# Patient Record
Sex: FEMALE | Race: WHITE | NOT HISPANIC OR LATINO | Employment: OTHER | URBAN - METROPOLITAN AREA
[De-identification: names, ages, dates, MRNs, and addresses within clinical notes are randomized per-mention and may not be internally consistent; named-entity substitution may affect disease eponyms.]

---

## 2024-04-15 ENCOUNTER — OFFICE VISIT (OUTPATIENT)
Dept: INTERNAL MEDICINE | Facility: CLINIC | Age: 79
End: 2024-04-15
Payer: MEDICARE

## 2024-04-15 VITALS
HEART RATE: 80 BPM | DIASTOLIC BLOOD PRESSURE: 82 MMHG | WEIGHT: 92 LBS | TEMPERATURE: 96.2 F | HEIGHT: 58 IN | OXYGEN SATURATION: 97 % | SYSTOLIC BLOOD PRESSURE: 118 MMHG | BODY MASS INDEX: 19.31 KG/M2

## 2024-04-15 DIAGNOSIS — M80.08XA AGE-RELATED OSTEOPOROSIS WITH CURRENT PATHOLOGICAL FRACTURE, VERTEBRA(E), INITIAL ENCOUNTER FOR FRACTURE: ICD-10-CM

## 2024-04-15 DIAGNOSIS — Z79.899 HIGH RISK MEDICATION USE: ICD-10-CM

## 2024-04-15 DIAGNOSIS — G47.00 INSOMNIA, UNSPECIFIED TYPE: ICD-10-CM

## 2024-04-15 DIAGNOSIS — G89.4 CHRONIC PAIN SYNDROME: Primary | ICD-10-CM

## 2024-04-15 DIAGNOSIS — G43.709 CHRONIC MIGRAINE WITHOUT AURA WITHOUT STATUS MIGRAINOSUS, NOT INTRACTABLE: ICD-10-CM

## 2024-04-15 PROCEDURE — 99205 OFFICE O/P NEW HI 60 MIN: CPT | Performed by: STUDENT IN AN ORGANIZED HEALTH CARE EDUCATION/TRAINING PROGRAM

## 2024-04-15 PROCEDURE — 1159F MED LIST DOCD IN RCRD: CPT | Performed by: STUDENT IN AN ORGANIZED HEALTH CARE EDUCATION/TRAINING PROGRAM

## 2024-04-15 PROCEDURE — 1160F RVW MEDS BY RX/DR IN RCRD: CPT | Performed by: STUDENT IN AN ORGANIZED HEALTH CARE EDUCATION/TRAINING PROGRAM

## 2024-04-15 RX ORDER — VENLAFAXINE HYDROCHLORIDE 37.5 MG/1
CAPSULE, EXTENDED RELEASE ORAL
Qty: 90 CAPSULE | Refills: 0 | Status: SHIPPED | OUTPATIENT
Start: 2024-04-15 | End: 2024-05-29

## 2024-04-15 RX ORDER — DESOXIMETASONE 2.5 MG/G
CREAM TOPICAL
COMMUNITY

## 2024-04-15 RX ORDER — SUMATRIPTAN 100 MG/1
TABLET, FILM COATED ORAL
COMMUNITY

## 2024-04-15 RX ORDER — TRAMADOL HYDROCHLORIDE 50 MG/1
1 TABLET ORAL EVERY 6 HOURS PRN
COMMUNITY
End: 2024-04-18 | Stop reason: SDUPTHER

## 2024-04-15 RX ORDER — ALENDRONATE SODIUM 70 MG/1
TABLET ORAL
COMMUNITY
End: 2024-04-18 | Stop reason: SDUPTHER

## 2024-04-15 RX ORDER — ACYCLOVIR 50 MG/G
OINTMENT TOPICAL
COMMUNITY
Start: 2023-10-31

## 2024-04-15 RX ORDER — ZOLPIDEM TARTRATE 10 MG/1
.5-1 TABLET ORAL
COMMUNITY

## 2024-04-15 RX ORDER — MELATONIN
2000 DAILY
COMMUNITY

## 2024-04-15 NOTE — PROGRESS NOTES
New Patient Office Visit      Patient Name: Rafaela Cunha  : 1945   MRN: [id number]   Care Team: Patient Care Team:  Lexa Moreno MD as PCP - General (Internal Medicine)    Chief Complaint:    Chief Complaint   Patient presents with    Establish Care    Insomnia    Osteoporosis    Pain       History of Present Illness: Rafaela Cunha is a 78 y.o. female who is here today to establish care as a new patient     She was previously living in [place] however moved here recently and [place] [facility]    She has no specific acute complaints or concerns today besides  management of her chronic pain 2/2 to multiple fragility fractures from severe osteoporosis. She states she has tried multiple other medications to treat her osteoporosis, including Forteo, Tymlos, and Evenity and she did not tolerate any of them due to significant bone pain after infusion/injection. She is currently on fosamax and reprots last DEXA was 2022. She currently takes tramadol for pain and takes it 1-2x/d depending on how bad her pain is, reports being on multiple other medications including morphine, oxycodone. She also has significant insomnia in which she takes ambien prn and this is also from her pain    Additionally she has frequent migraines, exacerbated by her pain and recent move from [place]. She has been taking sumatriptan multiple times a week but is not on preventive medication.    Subjective      Review of Systems:   Review of Systems   Constitutional:  Negative for unexpected weight loss.   Musculoskeletal:  Positive for arthralgias, back pain and gait problem.   Neurological:  Positive for headache.   Psychiatric/Behavioral:  Positive for sleep disturbance.     - See HPI    Past Medical History: History reviewed. No pertinent past medical history.    Past Surgical History: History reviewed. No pertinent surgical history.    Family History: History reviewed. No pertinent family history.    Social History:   Social History      Socioeconomic History    Marital status:    Tobacco Use    Smoking status: Never     Passive exposure: Never    Smokeless tobacco: Never   Vaping Use    Vaping status: Never Used   Substance and Sexual Activity    Alcohol use: Yes     Comment: RARE    Drug use: Never    Sexual activity: Defer       Tobacco History:   Social History     Tobacco Use   Smoking Status Never    Passive exposure: Never   Smokeless Tobacco Never       Medications:     Current Outpatient Medications:     acyclovir (ZOVIRAX) 5 % ointment, APPLY TO THE AFFECTED AREA(S) BY TOPICAL ROUTE EVERY 3 HOURS 6 TIMES PER DAY, Disp: , Rfl:     alendronate (FOSAMAX) 70 MG tablet, Take ONE tablet every week by oral route., Disp: , Rfl:     Calcium Citrate 150 MG capsule, Take 250 mg by mouth., Disp: , Rfl:     Cholecalciferol 25 MCG (1000 UT) tablet, Take 2 tablets by mouth Daily., Disp: , Rfl:     desoximetasone (TOPICORT) 0.25 % cream, APPLY A THIN LAYER TO THE AFFECTED AREA(S) BY TOPICAL ROUTE 2 TIMES PER DAY ; RUB IN GENTLY AND COMPLETELY, Disp: , Rfl:     MAGNESIUM GLYCINATE PO, Take  by mouth., Disp: , Rfl:     mupirocin (BACTROBAN) 2 % ointment, , Disp: , Rfl:     NON FORMULARY, Take 335 mg by mouth., Disp: , Rfl:     SUMAtriptan (IMITREX) 100 MG tablet, TAKE 1/2 TABLET BY MOUTH AT ONSET OF HEADACHE, Disp: , Rfl:     traMADol (ULTRAM) 50 MG tablet, Take 1 tablet by mouth Every 6 (Six) Hours As Needed., Disp: , Rfl:     zolpidem (AMBIEN) 10 MG tablet, Take 0.5-1 tablets by mouth every night at bedtime., Disp: , Rfl:     venlafaxine XR (Effexor XR) 37.5 MG 24 hr capsule, Take 1 capsule by mouth Daily for 14 days, THEN 2 capsules Daily for 30 days., Disp: 90 capsule, Rfl: 0    Allergies:   Allergies   Allergen Reactions    Ketorolac Tromethamine Other (See Comments)    Romosozumab Other (See Comments)    Abaloparatide Other (See Comments)    Codeine Other (See Comments)    Parathyroid Hormone (Recomb) Other (See Comments)    Raloxifene  "Other (See Comments)    Teriparatide Unknown - High Severity       Objective     Physical Exam:  Vital Signs:   Vitals:    04/15/24 1434   BP: 118/82   Pulse: 80   Temp: 96.2 °F (35.7 °C)   TempSrc: Temporal   SpO2: 97%   Weight: 41.7 kg (92 lb)   Height: 147.3 cm (58\")     Body mass index is 19.23 kg/m².     Physical Exam  Vitals reviewed.   Constitutional:       General: She is not in acute distress.     Appearance: Normal appearance. She is underweight. She is not ill-appearing or toxic-appearing.   HENT:      Head: Normocephalic and atraumatic.   Eyes:      General: No scleral icterus.     Conjunctiva/sclera: Conjunctivae normal.   Skin:     General: Skin is warm and dry.   Neurological:      Mental Status: She is alert and oriented to person, place, and time.   Psychiatric:         Mood and Affect: Mood normal.         Behavior: Behavior normal.         Assessment / Plan      Assessment/Plan:   Problems Addressed This Visit  Diagnoses and all orders for this visit:    1. Chronic pain syndrome (Primary)    2. Insomnia, unspecified type    3. Chronic migraine without aura without status migrainosus, not intractable  -     venlafaxine XR (Effexor XR) 37.5 MG 24 hr capsule; Take 1 capsule by mouth Daily for 14 days, THEN 2 capsules Daily for 30 days.  Dispense: 90 capsule; Refill: 0    4. High risk medication use  -     Compliance Drug Analysis, Ur - Urine, Clean Catch    5. Age-related osteoporosis with current pathological fracture, vertebra(e), initial encounter for fracture  -     DEXA Bone Density Axial; Future      Ms. Cunha is 77yo F who presents to clinic today as new patient to establish care. I have reviewed her most recent labs dating back to 2018 as well as available PCP notes in Care Everywhere    Chronic pain is somewhat managed with her tramadol, unfortunately this is all secondary to her reported severe osteoporosis with multiple fragility fractures. Has not tolerated multiple therapies, having " significant and debiliatating bone pain from Forteo, Tymlos, and Evenity. Will review [facility] and obtain UDS today with contract filled, continue tramadol for now.     Osteoporosis is likely going to be difficult to manage I suspect. Unable to see DEXA scan results but she reports significantly severe disease and unable to tolerate multiple options. Will try to receive outside records and have her scheduled for DEXA to further evaluate. Consider referral to specialist if necessary.     Migraines are chronic, worsening. Recently exacerbated by move across country, will start effexor for preventive. .     Will have her RTC In about 2mo, will have her get labs before       Plan of care reviewed with patient at the conclusion of today's visit. Education was provided regarding diagnosis and management.  Patient verbalizes understanding of and agreement with management plan.      Follow Up:   Return in about 2 months (around 6/15/2024) for Medicare Wellness.    I spent 62 minutes caring for Rafaela on this date of service. This time includes time spent by me in the following activities:preparing for the visit, reviewing tests, obtaining and/or reviewing a separately obtained history, performing a medically appropriate examination and/or evaluation , counseling and educating the patient/family/caregiver, ordering medications, tests, or procedures, documenting information in the medical record, and independently interpreting results and communicating that information with the patient/family/caregiver      MD [facility]EMIM[facility]  [address] [place] [phone]  Fax [phone]

## 2024-04-18 DIAGNOSIS — M80.08XA AGE-RELATED OSTEOPOROSIS WITH CURRENT PATHOLOGICAL FRACTURE, VERTEBRA(E), INITIAL ENCOUNTER FOR FRACTURE: ICD-10-CM

## 2024-04-18 DIAGNOSIS — G89.4 CHRONIC PAIN SYNDROME: Primary | ICD-10-CM

## 2024-04-18 RX ORDER — TRAMADOL HYDROCHLORIDE 50 MG/1
50 TABLET ORAL EVERY 6 HOURS PRN
Qty: 30 TABLET | Refills: 0 | Status: SHIPPED | OUTPATIENT
Start: 2024-04-18

## 2024-04-18 RX ORDER — ALENDRONATE SODIUM 70 MG/1
70 TABLET ORAL
Qty: 15 TABLET | Refills: 0 | Status: SHIPPED | OUTPATIENT
Start: 2024-04-18

## 2024-04-18 NOTE — TELEPHONE ENCOUNTER
Caller: Rafaela Cunha    Relationship: Self    Best call back number: [phone]     Requested Prescriptions:   Requested Prescriptions     Pending Prescriptions Disp Refills    traMADol (ULTRAM) 50 MG tablet       Sig: Take 1 tablet by mouth Every 6 (Six) Hours As Needed.    alendronate (FOSAMAX) 70 MG tablet          Pharmacy where request should be sent: [facility] #[id number] [address] AT [facility] MICHAEL  [place]  [phone] [facility] [phone]      Last office visit with prescribing clinician: 4/15/2024   Last telemedicine visit with prescribing clinician: Visit date not found   Next office visit with prescribing clinician: 6/14/2024     Additional details provided by patient: PATIENT STATES PROVIDER AGREED TO TAKE OVER BOTH MEDICATIONS FOR HER.     Does the patient have less than a 3 day supply:  [] Yes  [x] No    Would you like a call back once the refill request has been completed: [] Yes [x] No    If the office needs to give you a call back, can they leave a voicemail: [] Yes [x] No    Otoniel Gan Rep   04/18/24 09:57 EDT

## 2024-04-24 LAB — DRUGS UR: NORMAL

## 2024-05-06 ENCOUNTER — LAB (OUTPATIENT)
Facility: HOSPITAL | Age: 79
End: 2024-05-06
Payer: MEDICARE

## 2024-05-06 ENCOUNTER — HOSPITAL ENCOUNTER (OUTPATIENT)
Facility: HOSPITAL | Age: 79
Discharge: HOME | End: 2024-05-06
Payer: MEDICARE

## 2024-05-06 DIAGNOSIS — M80.08XA AGE-RELATED OSTEOPOROSIS WITH CURRENT PATHOLOGICAL FRACTURE, VERTEBRA(E), INITIAL ENCOUNTER FOR FRACTURE: ICD-10-CM

## 2024-05-06 LAB
25(OH)D3 SERPL-MCNC: 42.3 NG/ML (ref 30–100)
ALBUMIN SERPL-MCNC: 4.5 G/DL (ref 3.5–5.2)
ALBUMIN/GLOB SERPL: 2 G/DL
ALP SERPL-CCNC: 52 U/L (ref 39–117)
ALT SERPL W P-5'-P-CCNC: 15 U/L (ref 1–33)
ANION GAP SERPL CALCULATED.3IONS-SCNC: 9 MMOL/L (ref 5–15)
AST SERPL-CCNC: 17 U/L (ref 1–32)
BILIRUB SERPL-MCNC: 0.2 MG/DL (ref 0–1.2)
BUN SERPL-MCNC: 15 MG/DL (ref 8–23)
BUN/CREAT SERPL: 23.1 (ref 7–25)
CALCIUM SPEC-SCNC: 9.1 MG/DL (ref 8.6–10.5)
CHLORIDE SERPL-SCNC: 104 MMOL/L (ref 98–107)
CO2 SERPL-SCNC: 28 MMOL/L (ref 22–29)
CREAT SERPL-MCNC: 0.65 MG/DL (ref 0.57–1)
EGFRCR SERPLBLD CKD-EPI 2021: 91.4 ML/MIN/1.73
GLOBULIN UR ELPH-MCNC: 2.2 GM/DL
GLUCOSE SERPL-MCNC: 91 MG/DL (ref 65–99)
PHOSPHATE SERPL-MCNC: 3.7 MG/DL (ref 2.5–4.5)
POTASSIUM SERPL-SCNC: 4.3 MMOL/L (ref 3.5–5.2)
PROT SERPL-MCNC: 6.7 G/DL (ref 6–8.5)
SODIUM SERPL-SCNC: 141 MMOL/L (ref 136–145)

## 2024-05-06 PROCEDURE — 36415 COLL VENOUS BLD VENIPUNCTURE: CPT

## 2024-05-06 PROCEDURE — 77080 DXA BONE DENSITY AXIAL: CPT

## 2024-05-06 PROCEDURE — 82306 VITAMIN D 25 HYDROXY: CPT | Performed by: STUDENT IN AN ORGANIZED HEALTH CARE EDUCATION/TRAINING PROGRAM

## 2024-05-06 PROCEDURE — 84100 ASSAY OF PHOSPHORUS: CPT | Performed by: STUDENT IN AN ORGANIZED HEALTH CARE EDUCATION/TRAINING PROGRAM

## 2024-05-06 PROCEDURE — 80053 COMPREHEN METABOLIC PANEL: CPT | Performed by: STUDENT IN AN ORGANIZED HEALTH CARE EDUCATION/TRAINING PROGRAM

## 2024-05-08 DIAGNOSIS — M80.08XA AGE-RELATED OSTEOPOROSIS WITH CURRENT PATHOLOGICAL FRACTURE, VERTEBRA(E), INITIAL ENCOUNTER FOR FRACTURE: Primary | ICD-10-CM

## 2024-05-08 RX ORDER — SUMATRIPTAN 100 MG/1
TABLET, FILM COATED ORAL
Qty: 90 TABLET | Refills: 0 | Status: SHIPPED | OUTPATIENT
Start: 2024-05-08

## 2024-05-20 RX ORDER — ALENDRONATE SODIUM 70 MG/1
70 TABLET ORAL
Qty: 15 TABLET | Refills: 0 | Status: SHIPPED | OUTPATIENT
Start: 2024-05-20

## 2024-05-20 NOTE — TELEPHONE ENCOUNTER
Caller: Jeannette Cunhaan    Relationship: Self    Best call back number: [phone]    Requested Prescriptions:   Requested Prescriptions     Pending Prescriptions Disp Refills    alendronate (FOSAMAX) 70 MG tablet 15 tablet 0     Sig: Take 1 tablet by mouth Every 7 (Seven) Days.        Pharmacy where request should be sent:  [facility]     Last office visit with prescribing clinician: 4/15/2024   Last telemedicine visit with prescribing clinician: Visit date not found   Next office visit with prescribing clinician: 6/14/2024     Additional details provided by patient: N/A     Does the patient have less than a 3 day supply:  [x] Yes  [] No    Would you like a call back once the refill request has been completed: [x] Yes [] No    If the office needs to give you a call back, can they leave a voicemail: [x] Yes [] No    Otoniel Olvera Rep   05/20/24 14:33 EDT

## 2024-06-14 ENCOUNTER — OFFICE VISIT (OUTPATIENT)
Dept: INTERNAL MEDICINE | Facility: CLINIC | Age: 79
End: 2024-06-14
Payer: MEDICARE

## 2024-06-14 ENCOUNTER — LAB (OUTPATIENT)
Facility: HOSPITAL | Age: 79
End: 2024-06-14
Payer: MEDICARE

## 2024-06-14 VITALS
HEIGHT: 58 IN | SYSTOLIC BLOOD PRESSURE: 122 MMHG | DIASTOLIC BLOOD PRESSURE: 60 MMHG | HEART RATE: 91 BPM | BODY MASS INDEX: 19.73 KG/M2 | WEIGHT: 94 LBS | TEMPERATURE: 98.5 F

## 2024-06-14 DIAGNOSIS — G47.00 INSOMNIA, UNSPECIFIED TYPE: ICD-10-CM

## 2024-06-14 DIAGNOSIS — M80.08XA AGE-RELATED OSTEOPOROSIS WITH CURRENT PATHOLOGICAL FRACTURE, VERTEBRA(E), INITIAL ENCOUNTER FOR FRACTURE: ICD-10-CM

## 2024-06-14 DIAGNOSIS — R61 NIGHT SWEATS: ICD-10-CM

## 2024-06-14 DIAGNOSIS — Z00.00 MEDICARE ANNUAL WELLNESS VISIT, SUBSEQUENT: Primary | ICD-10-CM

## 2024-06-14 DIAGNOSIS — G89.4 CHRONIC PAIN SYNDROME: ICD-10-CM

## 2024-06-14 DIAGNOSIS — G43.709 CHRONIC MIGRAINE WITHOUT AURA WITHOUT STATUS MIGRAINOSUS, NOT INTRACTABLE: ICD-10-CM

## 2024-06-14 DIAGNOSIS — Z11.59 ENCOUNTER FOR HEPATITIS C SCREENING TEST FOR LOW RISK PATIENT: ICD-10-CM

## 2024-06-14 LAB
BASOPHILS # BLD AUTO: 0.04 10*3/MM3 (ref 0–0.2)
BASOPHILS NFR BLD AUTO: 0.7 % (ref 0–1.5)
DEPRECATED RDW RBC AUTO: 41 FL (ref 37–54)
EOSINOPHIL # BLD AUTO: 0.15 10*3/MM3 (ref 0–0.4)
EOSINOPHIL NFR BLD AUTO: 2.5 % (ref 0.3–6.2)
ERYTHROCYTE [DISTWIDTH] IN BLOOD BY AUTOMATED COUNT: 12.1 % (ref 12.3–15.4)
HCT VFR BLD AUTO: 43.7 % (ref 34–46.6)
HCV AB SER QL: NORMAL
HGB BLD-MCNC: 14.8 G/DL (ref 12–15.9)
IMM GRANULOCYTES # BLD AUTO: 0.01 10*3/MM3 (ref 0–0.05)
IMM GRANULOCYTES NFR BLD AUTO: 0.2 % (ref 0–0.5)
LYMPHOCYTES # BLD AUTO: 1.36 10*3/MM3 (ref 0.7–3.1)
LYMPHOCYTES NFR BLD AUTO: 22.4 % (ref 19.6–45.3)
MCH RBC QN AUTO: 31.6 PG (ref 26.6–33)
MCHC RBC AUTO-ENTMCNC: 33.9 G/DL (ref 31.5–35.7)
MCV RBC AUTO: 93.4 FL (ref 79–97)
MONOCYTES # BLD AUTO: 0.64 10*3/MM3 (ref 0.1–0.9)
MONOCYTES NFR BLD AUTO: 10.5 % (ref 5–12)
NEUTROPHILS NFR BLD AUTO: 3.87 10*3/MM3 (ref 1.7–7)
NEUTROPHILS NFR BLD AUTO: 63.7 % (ref 42.7–76)
NRBC BLD AUTO-RTO: 0 /100 WBC (ref 0–0.2)
PLATELET # BLD AUTO: 203 10*3/MM3 (ref 140–450)
PMV BLD AUTO: 10.7 FL (ref 6–12)
RBC # BLD AUTO: 4.68 10*6/MM3 (ref 3.77–5.28)
T4 FREE SERPL-MCNC: 1.07 NG/DL (ref 0.92–1.68)
TSH SERPL DL<=0.05 MIU/L-ACNC: 0.62 UIU/ML (ref 0.27–4.2)
WBC NRBC COR # BLD AUTO: 6.07 10*3/MM3 (ref 3.4–10.8)

## 2024-06-14 PROCEDURE — 84443 ASSAY THYROID STIM HORMONE: CPT | Performed by: STUDENT IN AN ORGANIZED HEALTH CARE EDUCATION/TRAINING PROGRAM

## 2024-06-14 PROCEDURE — 86803 HEPATITIS C AB TEST: CPT | Performed by: STUDENT IN AN ORGANIZED HEALTH CARE EDUCATION/TRAINING PROGRAM

## 2024-06-14 PROCEDURE — 99214 OFFICE O/P EST MOD 30 MIN: CPT | Performed by: STUDENT IN AN ORGANIZED HEALTH CARE EDUCATION/TRAINING PROGRAM

## 2024-06-14 PROCEDURE — 85025 COMPLETE CBC W/AUTO DIFF WBC: CPT | Performed by: STUDENT IN AN ORGANIZED HEALTH CARE EDUCATION/TRAINING PROGRAM

## 2024-06-14 PROCEDURE — 84439 ASSAY OF FREE THYROXINE: CPT | Performed by: STUDENT IN AN ORGANIZED HEALTH CARE EDUCATION/TRAINING PROGRAM

## 2024-06-14 PROCEDURE — 36415 COLL VENOUS BLD VENIPUNCTURE: CPT | Performed by: STUDENT IN AN ORGANIZED HEALTH CARE EDUCATION/TRAINING PROGRAM

## 2024-06-14 PROCEDURE — G0439 PPPS, SUBSEQ VISIT: HCPCS | Performed by: STUDENT IN AN ORGANIZED HEALTH CARE EDUCATION/TRAINING PROGRAM

## 2024-06-14 PROCEDURE — 1170F FXNL STATUS ASSESSED: CPT | Performed by: STUDENT IN AN ORGANIZED HEALTH CARE EDUCATION/TRAINING PROGRAM

## 2024-06-14 RX ORDER — TRAMADOL HYDROCHLORIDE 50 MG/1
100 TABLET ORAL EVERY 12 HOURS PRN
Qty: 180 TABLET | Refills: 0 | Status: SHIPPED | OUTPATIENT
Start: 2024-06-14

## 2024-06-14 RX ORDER — ZOLPIDEM TARTRATE 10 MG/1
5 TABLET ORAL NIGHTLY PRN
Qty: 90 TABLET | Refills: 0 | Status: SHIPPED | OUTPATIENT
Start: 2024-06-14

## 2024-06-14 RX ORDER — AMITRIPTYLINE HYDROCHLORIDE 10 MG/1
10 TABLET, FILM COATED ORAL NIGHTLY
Qty: 60 TABLET | Refills: 0 | Status: SHIPPED | OUTPATIENT
Start: 2024-06-14

## 2024-06-17 NOTE — PROGRESS NOTES
Her labs all look great with no signs of infection, blood count abnormalities or thyroid dysfunction.

## 2024-08-23 DIAGNOSIS — G89.4 CHRONIC PAIN SYNDROME: ICD-10-CM

## 2024-08-23 DIAGNOSIS — M80.08XA AGE-RELATED OSTEOPOROSIS WITH CURRENT PATHOLOGICAL FRACTURE, VERTEBRA(E), INITIAL ENCOUNTER FOR FRACTURE: ICD-10-CM

## 2024-08-23 RX ORDER — TRAMADOL HYDROCHLORIDE 50 MG/1
100 TABLET ORAL EVERY 12 HOURS PRN
Qty: 180 TABLET | Refills: 0 | Status: SHIPPED | OUTPATIENT
Start: 2024-08-23

## 2024-08-23 NOTE — TELEPHONE ENCOUNTER
Caller: Rafaela Cunha    Relationship: Self    Best call back number: [phone]     Requested Prescriptions:   Requested Prescriptions     Pending Prescriptions Disp Refills    traMADol (ULTRAM) 50 MG tablet 180 tablet 0     Sig: Take 2 tablets by mouth Every 12 (Twelve) Hours As Needed for Severe Pain.        Pharmacy where request should be sent: [facility] #[id number] [place] [id number] [place] [phone] [place] [phone]      Last office visit with prescribing clinician: 6/14/2024   Last telemedicine visit with prescribing clinician: Visit date not found   Next office visit with prescribing clinician: 9/17/2024     Additional details provided by patient: PATIENT HAS A 2 DAY SUPPLY. PATIENT IS NEEDING THIS PRESCRIPTION SWITCHED TO THE [facility] LISTED ABOVE.    Does the patient have less than a 3 day supply:  [x] Yes  [] No    Would you like a call back once the refill request has been completed: [] Yes [x] No    If the office needs to give you a call back, can they leave a voicemail: [] Yes [x] No    Otoniel Wallace Rep   08/23/24 09:19 EDT

## 2024-08-30 RX ORDER — SUMATRIPTAN 100 MG/1
TABLET, FILM COATED ORAL
Qty: 90 TABLET | Refills: 0 | Status: SHIPPED | OUTPATIENT
Start: 2024-08-30

## 2024-08-30 NOTE — TELEPHONE ENCOUNTER
Caller: Rafaela Cunha    Relationship: Self    Best call back number: [phone]     Requested Prescriptions:   Requested Prescriptions     Pending Prescriptions Disp Refills    SUMAtriptan (IMITREX) 100 MG tablet 90 tablet 0     Sig: Take one tablet at onset of headache. May repeat dose one time in 2 hours if headache not relieved.        Pharm[facility] where request [facility] be sent[id number] [address] [place] [phone] [place] [phone] [facility]     Last office visit with prescribing clinician: 6/14/2024   Last telemedicine visit with prescribing clinician: Visit date not found   Next office visit with prescribing clinician: 9/17/2024     Additional details provided by patient: 3 PILLS LEFT    Does the patient have less than a 3 day supply:  [] Yes  [x] No    Would you like a call back once the refill request has been completed: [] Yes [x] No    If the office needs to give you a call back, can they leave a voicemail: [] Yes [x] No    Otoniel Gan Rep   08/30/24 09:10 EDT

## 2024-09-17 ENCOUNTER — OFFICE VISIT (OUTPATIENT)
Dept: INTERNAL MEDICINE | Facility: CLINIC | Age: 79
End: 2024-09-17
Payer: MEDICARE

## 2024-09-17 VITALS
DIASTOLIC BLOOD PRESSURE: 78 MMHG | TEMPERATURE: 95.2 F | WEIGHT: 93.4 LBS | OXYGEN SATURATION: 98 % | HEART RATE: 73 BPM | HEIGHT: 58 IN | SYSTOLIC BLOOD PRESSURE: 106 MMHG | BODY MASS INDEX: 19.61 KG/M2

## 2024-09-17 DIAGNOSIS — G89.29 CHRONIC MIDLINE LOW BACK PAIN WITHOUT SCIATICA: ICD-10-CM

## 2024-09-17 DIAGNOSIS — Z23 NEED FOR INFLUENZA VACCINATION: ICD-10-CM

## 2024-09-17 DIAGNOSIS — M54.50 CHRONIC MIDLINE LOW BACK PAIN WITHOUT SCIATICA: ICD-10-CM

## 2024-09-17 DIAGNOSIS — M80.08XA AGE-RELATED OSTEOPOROSIS WITH CURRENT PATHOLOGICAL FRACTURE, VERTEBRA(E), INITIAL ENCOUNTER FOR FRACTURE: Primary | ICD-10-CM

## 2024-09-17 DIAGNOSIS — G89.4 CHRONIC PAIN SYNDROME: ICD-10-CM

## 2024-09-17 PROBLEM — M81.0 OSTEOPOROSIS: Status: ACTIVE | Noted: 2017-10-29

## 2024-09-17 PROBLEM — G47.00 INSOMNIA: Status: ACTIVE | Noted: 2017-10-29

## 2024-09-17 PROBLEM — S22.009A CLOSED FRACTURE OF THORACIC VERTEBRA: Chronic | Status: ACTIVE | Noted: 2020-10-14

## 2024-09-17 PROBLEM — S32.9XXD COMPRESSION FRACTURE OF PELVIS WITH ROUTINE HEALING: Status: ACTIVE | Noted: 2021-12-16

## 2024-09-17 PROCEDURE — 90662 IIV NO PRSV INCREASED AG IM: CPT | Performed by: STUDENT IN AN ORGANIZED HEALTH CARE EDUCATION/TRAINING PROGRAM

## 2024-09-17 PROCEDURE — 1159F MED LIST DOCD IN RCRD: CPT | Performed by: STUDENT IN AN ORGANIZED HEALTH CARE EDUCATION/TRAINING PROGRAM

## 2024-09-17 PROCEDURE — G0008 ADMIN INFLUENZA VIRUS VAC: HCPCS | Performed by: STUDENT IN AN ORGANIZED HEALTH CARE EDUCATION/TRAINING PROGRAM

## 2024-09-17 PROCEDURE — 1125F AMNT PAIN NOTED PAIN PRSNT: CPT | Performed by: STUDENT IN AN ORGANIZED HEALTH CARE EDUCATION/TRAINING PROGRAM

## 2024-09-17 PROCEDURE — 1160F RVW MEDS BY RX/DR IN RCRD: CPT | Performed by: STUDENT IN AN ORGANIZED HEALTH CARE EDUCATION/TRAINING PROGRAM

## 2024-09-17 PROCEDURE — 99214 OFFICE O/P EST MOD 30 MIN: CPT | Performed by: STUDENT IN AN ORGANIZED HEALTH CARE EDUCATION/TRAINING PROGRAM

## 2024-09-17 RX ORDER — DULOXETIN HYDROCHLORIDE 30 MG/1
30 CAPSULE, DELAYED RELEASE ORAL DAILY
Qty: 90 CAPSULE | Refills: 1 | Status: SHIPPED | OUTPATIENT
Start: 2024-09-17

## 2024-09-17 RX ORDER — DULOXETIN HYDROCHLORIDE 30 MG/1
30 CAPSULE, DELAYED RELEASE ORAL DAILY
Qty: 90 CAPSULE | Refills: 1 | Status: SHIPPED | OUTPATIENT
Start: 2024-09-17 | End: 2024-09-17

## 2024-09-18 RX ORDER — ALENDRONATE SODIUM 70 MG/1
70 TABLET ORAL
Qty: 16 TABLET | Refills: 0 | Status: SHIPPED | OUTPATIENT
Start: 2024-09-18

## 2024-09-27 ENCOUNTER — HOSPITAL ENCOUNTER (OUTPATIENT)
Facility: HOSPITAL | Age: 79
Discharge: HOME | End: 2024-09-27
Payer: MEDICARE

## 2024-09-27 ENCOUNTER — OFFICE VISIT (OUTPATIENT)
Dept: INTERNAL MEDICINE | Facility: CLINIC | Age: 79
End: 2024-09-27
Payer: MEDICARE

## 2024-09-27 VITALS
SYSTOLIC BLOOD PRESSURE: 128 MMHG | TEMPERATURE: 98.8 F | BODY MASS INDEX: 19.69 KG/M2 | DIASTOLIC BLOOD PRESSURE: 78 MMHG | WEIGHT: 93.8 LBS | HEIGHT: 58 IN

## 2024-09-27 DIAGNOSIS — M80.08XA AGE-RELATED OSTEOPOROSIS WITH CURRENT PATHOLOGICAL FRACTURE, VERTEBRA(E), INITIAL ENCOUNTER FOR FRACTURE: ICD-10-CM

## 2024-09-27 DIAGNOSIS — G89.4 CHRONIC PAIN SYNDROME: ICD-10-CM

## 2024-09-27 DIAGNOSIS — S22.069S CLOSED FRACTURE OF EIGHTH THORACIC VERTEBRA, UNSPECIFIED FRACTURE MORPHOLOGY, SEQUELA: Chronic | ICD-10-CM

## 2024-09-27 DIAGNOSIS — M54.6 ACUTE MIDLINE THORACIC BACK PAIN: Primary | ICD-10-CM

## 2024-09-27 PROCEDURE — 1125F AMNT PAIN NOTED PAIN PRSNT: CPT | Performed by: STUDENT IN AN ORGANIZED HEALTH CARE EDUCATION/TRAINING PROGRAM

## 2024-09-27 PROCEDURE — G2211 COMPLEX E/M VISIT ADD ON: HCPCS | Performed by: STUDENT IN AN ORGANIZED HEALTH CARE EDUCATION/TRAINING PROGRAM

## 2024-09-27 PROCEDURE — 1159F MED LIST DOCD IN RCRD: CPT | Performed by: STUDENT IN AN ORGANIZED HEALTH CARE EDUCATION/TRAINING PROGRAM

## 2024-09-27 PROCEDURE — 72100 X-RAY EXAM L-S SPINE 2/3 VWS: CPT

## 2024-09-27 PROCEDURE — 1160F RVW MEDS BY RX/DR IN RCRD: CPT | Performed by: STUDENT IN AN ORGANIZED HEALTH CARE EDUCATION/TRAINING PROGRAM

## 2024-09-27 PROCEDURE — 72070 X-RAY EXAM THORAC SPINE 2VWS: CPT

## 2024-09-27 PROCEDURE — 99214 OFFICE O/P EST MOD 30 MIN: CPT | Performed by: STUDENT IN AN ORGANIZED HEALTH CARE EDUCATION/TRAINING PROGRAM

## 2024-09-27 RX ORDER — HYDROCODONE BITARTRATE AND ACETAMINOPHEN 5; 325 MG/1; MG/1
1 TABLET ORAL EVERY 6 HOURS PRN
Qty: 60 TABLET | Refills: 0 | Status: SHIPPED | OUTPATIENT
Start: 2024-09-27

## 2024-10-01 ENCOUNTER — TELEPHONE (OUTPATIENT)
Dept: INTERNAL MEDICINE | Facility: CLINIC | Age: 79
End: 2024-10-01
Payer: MEDICARE

## 2024-10-01 NOTE — TELEPHONE ENCOUNTER
Caller: Rafaela Cunha    Relationship: Self    Best call back number: [phone]       Who are you requesting to speak with (clinical staff, provider,  specific staff member): PCP OR CLINICAL      What was the call regarding: PATIENT WOULD LIKE TO CHANGE THE REFERRAL TO PAIN MANAGEMENT TO [facility], DR. SOW.    PHONE: [phone]  FAX: [phone]

## 2024-10-01 NOTE — TELEPHONE ENCOUNTER
Please let her know her xray resulted today and shows multilevel lumbar disc degeneration, significant compression of her spine at the T8 level. While it doesn't mention any acute fracture, given how severe her bone weakness it is it may be difficult to assess the acuity of this.     Regardless, we are working to refer her to her requested pain doctor

## 2024-10-01 NOTE — TELEPHONE ENCOUNTER
Are we able to cancel previous referral or should I just place a new/separate order to this specialist

## 2024-10-01 NOTE — TELEPHONE ENCOUNTER
Caller: Rafaela Cunha    Relationship: Self    Best call back number: [phone]       What test was performed: XRAYS    When was the test performed: 9/27/24    Where was the test performed: [facility]    Additional notes: PATIENT REQUESTS CALL BACK TO DISCUSS RESULTS WHEN AVAILABLE.

## 2024-10-02 ENCOUNTER — TELEPHONE (OUTPATIENT)
Dept: INTERNAL MEDICINE | Facility: CLINIC | Age: 79
End: 2024-10-02
Payer: MEDICARE

## 2024-10-02 NOTE — TELEPHONE ENCOUNTER
Caller: Rafaela Cunha    Relationship: Self    Best call back number: [phone]     What orders are you requesting (i.e. lab or imaging): MRI L SPINE    In what timeframe would the patient need to come in: 10/04/24    Where will you receive your lab/imaging services: [facility]     Additional notes: PATIENT IS CALLING TO STATE SHE IS HAVING A T SPINE MRI DONE ON 10/04/24.  SHE IS WANTING TO KNOW IF DR BAE WOULD RECOMMEND THAT SHE ALSO GET AN L SPINE AT THE SAME TIME.  SHE STATES SHE IS HAVING A LOT OF PAIN AND DOES NOT WANT TO HAVE TO GO BACK AGAIN IF SHE NEEDS TO GET IT DONE.    PLEASE ADVISE.

## 2024-10-02 NOTE — TELEPHONE ENCOUNTER
Caller: Rafaela Cunha    Relationship: Self    Best call back number: [phone]     Caller requesting test results: XRAY    What test was performed: XRAY OF SPINE    When was the test performed: 09/27/2024    Where was the test performed: [facility]    Additional notes: PATIENT WOULD LIKE THE DOCTOR TO FOLLOW UP WITH HER ABOUT THE RESULTS OF THIS IMAGING.     PLEASE CALL PATIENT TO DISCUSS AND ADVISE.

## 2024-10-03 DIAGNOSIS — M51.362 OTHER INTERVERTEBRAL DISC DEGENERATION, LUMBAR REGION WITH DISCOGENIC BACK PAIN AND LOWER EXTREMITY PAIN: ICD-10-CM

## 2024-10-03 DIAGNOSIS — G89.29 CHRONIC MIDLINE LOW BACK PAIN WITHOUT SCIATICA: Primary | ICD-10-CM

## 2024-10-03 DIAGNOSIS — M54.50 ACUTE MIDLINE LOW BACK PAIN WITHOUT SCIATICA: ICD-10-CM

## 2024-10-03 DIAGNOSIS — M54.50 CHRONIC MIDLINE LOW BACK PAIN WITHOUT SCIATICA: Primary | ICD-10-CM

## 2024-10-03 NOTE — TELEPHONE ENCOUNTER
Could you let her know that I think that her thoracic spine/area is the cause of problem but given her xray findings showing some lumbar changes I think it's reasonable and a good idea to also get lumbar MRI as well. I have placed order for this to be done tomorrow so she should be good to have this done at that time

## 2024-10-04 ENCOUNTER — HOSPITAL ENCOUNTER (OUTPATIENT)
Facility: HOSPITAL | Age: 79
Discharge: HOME | End: 2024-10-04
Payer: MEDICARE

## 2024-10-04 DIAGNOSIS — M54.6 ACUTE MIDLINE THORACIC BACK PAIN: ICD-10-CM

## 2024-10-04 DIAGNOSIS — S22.069S CLOSED FRACTURE OF EIGHTH THORACIC VERTEBRA, UNSPECIFIED FRACTURE MORPHOLOGY, SEQUELA: Chronic | ICD-10-CM

## 2024-10-04 PROCEDURE — 72146 MRI CHEST SPINE W/O DYE: CPT

## 2024-10-07 ENCOUNTER — TELEPHONE (OUTPATIENT)
Dept: INTERNAL MEDICINE | Facility: CLINIC | Age: 79
End: 2024-10-07
Payer: MEDICARE

## 2024-10-07 DIAGNOSIS — K21.9 GASTROESOPHAGEAL REFLUX DISEASE, UNSPECIFIED WHETHER ESOPHAGITIS PRESENT: Primary | ICD-10-CM

## 2024-10-07 DIAGNOSIS — M80.08XA AGE-RELATED OSTEOPOROSIS WITH CURRENT PATHOLOGICAL FRACTURE, VERTEBRA(E), INITIAL ENCOUNTER FOR FRACTURE: ICD-10-CM

## 2024-10-07 DIAGNOSIS — G89.4 CHRONIC PAIN SYNDROME: ICD-10-CM

## 2024-10-07 RX ORDER — FAMOTIDINE 20 MG/1
20 TABLET, FILM COATED ORAL 2 TIMES DAILY PRN
Qty: 60 TABLET | Refills: 2 | Status: SHIPPED | OUTPATIENT
Start: 2024-10-07

## 2024-10-07 RX ORDER — TRAMADOL HYDROCHLORIDE 50 MG/1
100 TABLET ORAL EVERY 12 HOURS PRN
Qty: 180 TABLET | Refills: 0 | Status: SHIPPED | OUTPATIENT
Start: 2024-10-07

## 2024-10-07 NOTE — TELEPHONE ENCOUNTER
Caller: Rafaela Cunha    Relationship to patient: Self    Best call back number: [phone]    Patient is needing: PATIENT STATES THAT SHE IS HAVING ACID REFLUX, AND HAS STOPPED TAKING HER FOSAMAX DUE TO THIS. SH WOULD LIKE TO KNOW IF THERE IS ANYTHING DR. BAE CAN ADVISE THAT SHE DO, SHE ISNT EATING BECAUSE IT IS SO PAINFUL. PLEASE REACH OUT TO PATIENT AND ADVISE.

## 2024-10-07 NOTE — TELEPHONE ENCOUNTER
Caller: Rafaela Cunha    Relationship: Self    Best call back number: [phone]    Requested Prescriptions:   Requested Prescriptions     Pending Prescriptions Disp Refills    traMADol (ULTRAM) 50 MG tablet 180 tablet 0     Sig: Take 2 tablets by mouth Every 12 (Twelve) Hours As Needed for Severe Pain.        Pharmacy where request should be sent: [facility] #[id number] [place] [address] AT [facility] MICHAEL [place] - [phone] [facility] [phone]      Last office visit with prescribing clinician: 9/27/2024   Last telemedicine visit with prescribing clinician: Visit date not found   Next office visit with prescribing clinician: 11/26/2024     Additional details provided by patient:     Does the patient have less than a 3 day supply:  [x] Yes  [] No      Otoniel Hernandez Rep   10/07/24 10:10 EDT

## 2024-10-14 ENCOUNTER — OFFICE VISIT (OUTPATIENT)
Dept: INTERNAL MEDICINE | Facility: CLINIC | Age: 79
End: 2024-10-14
Payer: MEDICARE

## 2024-10-14 VITALS
HEIGHT: 58 IN | OXYGEN SATURATION: 98 % | SYSTOLIC BLOOD PRESSURE: 122 MMHG | BODY MASS INDEX: 19.82 KG/M2 | WEIGHT: 94.4 LBS | DIASTOLIC BLOOD PRESSURE: 82 MMHG | TEMPERATURE: 94.2 F | HEART RATE: 82 BPM

## 2024-10-14 DIAGNOSIS — G89.4 CHRONIC PAIN SYNDROME: ICD-10-CM

## 2024-10-14 DIAGNOSIS — M54.6 ACUTE MIDLINE THORACIC BACK PAIN: Primary | ICD-10-CM

## 2024-10-14 DIAGNOSIS — M51.362 OTHER INTERVERTEBRAL DISC DEGENERATION, LUMBAR REGION WITH DISCOGENIC BACK PAIN AND LOWER EXTREMITY PAIN: ICD-10-CM

## 2024-10-14 DIAGNOSIS — M80.08XA AGE-RELATED OSTEOPOROSIS WITH CURRENT PATHOLOGICAL FRACTURE, VERTEBRA(E), INITIAL ENCOUNTER FOR FRACTURE: ICD-10-CM

## 2024-10-14 PROCEDURE — 99214 OFFICE O/P EST MOD 30 MIN: CPT | Performed by: STUDENT IN AN ORGANIZED HEALTH CARE EDUCATION/TRAINING PROGRAM

## 2024-10-14 PROCEDURE — 1160F RVW MEDS BY RX/DR IN RCRD: CPT | Performed by: STUDENT IN AN ORGANIZED HEALTH CARE EDUCATION/TRAINING PROGRAM

## 2024-10-14 PROCEDURE — 1159F MED LIST DOCD IN RCRD: CPT | Performed by: STUDENT IN AN ORGANIZED HEALTH CARE EDUCATION/TRAINING PROGRAM

## 2024-10-14 PROCEDURE — 1125F AMNT PAIN NOTED PAIN PRSNT: CPT | Performed by: STUDENT IN AN ORGANIZED HEALTH CARE EDUCATION/TRAINING PROGRAM

## 2024-10-14 PROCEDURE — G2211 COMPLEX E/M VISIT ADD ON: HCPCS | Performed by: STUDENT IN AN ORGANIZED HEALTH CARE EDUCATION/TRAINING PROGRAM

## 2024-10-14 RX ORDER — HYDROCODONE BITARTRATE AND ACETAMINOPHEN 5; 325 MG/1; MG/1
2 TABLET ORAL EVERY 6 HOURS PRN
Qty: 60 TABLET | Refills: 0 | Status: SHIPPED | OUTPATIENT
Start: 2024-10-14 | End: 2024-10-18 | Stop reason: SDUPTHER

## 2024-10-14 NOTE — PROGRESS NOTES
"Chief Complaint  No chief complaint on file.    Subjective        Rafaela Cunha presents to [facility]  History of Present Illness    Presents to clinic today for follow-up and persistent worsening pain    She continues to have severe back pain from her known compression fractures.  She has had to take 2 Norco in addition to 100 mg of tramadol.  She states she has been also getting migraines from the pain.  She is interested in establishing with nonsurgical provider        Objective   Vital Signs:  /82   Pulse 82   Temp 94.2 °F (34.6 °C) (Temporal)   Ht 147.3 cm (57.99\")   Wt 42.8 kg (94 lb 6.4 oz)   SpO2 98%   BMI 19.74 kg/m²   Estimated body mass index is 19.74 kg/m² as calculated from the following:    Height as of this encounter: 147.3 cm (57.99\").    Weight as of this encounter: 42.8 kg (94 lb 6.4 oz).    BMI is within normal parameters. No other follow-up for BMI required.      Physical Exam  Vitals reviewed.   Constitutional:       General: She is not in acute distress.     Appearance: Normal appearance. She is underweight. She is not toxic-appearing.   HENT:      Head: Normocephalic and atraumatic.   Musculoskeletal:      Thoracic back: Bony tenderness present. No deformity. Decreased range of motion. Scoliosis present.      Lumbar back: Decreased range of motion.   Skin:     General: Skin is warm and dry.   Neurological:      Mental Status: She is alert and oriented to person, place, and time.      Sensory: Sensation is intact.      Motor: Weakness present.      Gait: Gait abnormal.      Comments: Weakness present with all lower extremities, decreased ROM with flexion and extension of hip   Psychiatric:         Mood and Affect: Mood normal.         Behavior: Behavior normal.        Result Review :                Assessment and Plan   Diagnoses and all orders for this visit:    1. Acute midline thoracic back pain (Primary)  -     HYDROcodone-acetaminophen (Norco) 5-325 " MG per tablet; Take 2 tablets by mouth Every 6 (Six) Hours As Needed for Severe Pain.  Dispense: 60 tablet; Refill: 0  -     Ambulatory Referral to Sports Medicine    2. Chronic pain syndrome  -     HYDROcodone-acetaminophen (Norco) 5-325 MG per tablet; Take 2 tablets by mouth Every 6 (Six) Hours As Needed for Severe Pain.  Dispense: 60 tablet; Refill: 0    3. Other intervertebral disc degeneration, lumbar region with discogenic back pain and lower extremity pain  -     Ambulatory Referral to Sports Medicine    4. Age-related osteoporosis with current pathological fracture, vertebra(e), initial encounter for fracture      Will plan to increase her Norco given her worsening chronic pain secondary to multiple compression fractures and severe osteoporosis.  I will plan to refer her to nonsurgical specialty and I am in the process of contacting and discussing with Dr. Verdin with [facility] regarding her bone health and what options she could benefit from.      She has stopped taking Fosamax due to GI symptoms and reflux which I think is reasonable she has been taking for 3 years.  I do not suspect that resuming this will have a huge effect on her severe osteoporosis, will continue to have discussions with Dr. Verdin regarding her bone health    Return to clinic as previously scheduled or sooner as needed       I spent 35 minutes caring for Rafaela on this date of service. This time includes time spent by me in the following activities:preparing for the visit, reviewing tests, performing a medically appropriate examination and/or evaluation , counseling and educating the patient/family/caregiver, referring and communicating with other health care professionals , documenting information in the medical record, and care coordination  Follow Up   No follow-ups on file.  Patient was given instructions and counseling regarding her condition or for health maintenance advice. Please see specific information pulled into the AVS if  appropriate.

## 2024-10-15 DIAGNOSIS — M51.362 OTHER INTERVERTEBRAL DISC DEGENERATION, LUMBAR REGION WITH DISCOGENIC BACK PAIN AND LOWER EXTREMITY PAIN: Primary | ICD-10-CM

## 2024-10-15 DIAGNOSIS — M80.08XA AGE-RELATED OSTEOPOROSIS WITH CURRENT PATHOLOGICAL FRACTURE, VERTEBRA(E), INITIAL ENCOUNTER FOR FRACTURE: ICD-10-CM

## 2024-10-15 DIAGNOSIS — S22.069S CLOSED FRACTURE OF EIGHTH THORACIC VERTEBRA, UNSPECIFIED FRACTURE MORPHOLOGY, SEQUELA: ICD-10-CM

## 2024-10-18 DIAGNOSIS — G89.4 CHRONIC PAIN SYNDROME: ICD-10-CM

## 2024-10-18 DIAGNOSIS — M54.6 ACUTE MIDLINE THORACIC BACK PAIN: ICD-10-CM

## 2024-10-18 RX ORDER — HYDROCODONE BITARTRATE AND ACETAMINOPHEN 5; 325 MG/1; MG/1
1 TABLET ORAL EVERY 4 HOURS PRN
Qty: 60 TABLET | Refills: 0 | Status: SHIPPED | OUTPATIENT
Start: 2024-10-18

## 2024-10-21 ENCOUNTER — TELEPHONE (OUTPATIENT)
Dept: INTERNAL MEDICINE | Facility: CLINIC | Age: 79
End: 2024-10-21
Payer: MEDICARE

## 2024-10-21 NOTE — TELEPHONE ENCOUNTER
Caller: Rafaela Cunha    Relationship: Self    Best call back number:     What is the medical concern/diagnosis:     What specialty or service is being requested: PAIN MANAGEMENT    What is the provider, practice or medical service name:     What is the office location: [facility]    What is the office phone number:     Any additional details: PATIENT IS CALLING IN STATING THAT SHE WENT TO PAIN MANAGEMENT AT THE [facility] AND SHE SAYS IT WAS HORRIBLE AND SHE DOES NOT WANT TO GO BACK THERE.

## 2024-10-21 NOTE — TELEPHONE ENCOUNTER
Can you clarify if she is wanting a new referral to a different pain doctor or just letting us know? Let her know that we are in process of referring her to Dr. Coburn who is a specialist in her kind of fractures and osteoporosis

## 2024-10-22 DIAGNOSIS — M51.362 OTHER INTERVERTEBRAL DISC DEGENERATION, LUMBAR REGION WITH DISCOGENIC BACK PAIN AND LOWER EXTREMITY PAIN: ICD-10-CM

## 2024-10-22 DIAGNOSIS — G89.4 CHRONIC PAIN SYNDROME: ICD-10-CM

## 2024-10-22 DIAGNOSIS — M54.6 ACUTE MIDLINE THORACIC BACK PAIN: Primary | ICD-10-CM

## 2024-11-26 ENCOUNTER — OFFICE VISIT (OUTPATIENT)
Dept: INTERNAL MEDICINE | Facility: CLINIC | Age: 79
End: 2024-11-26
Payer: MEDICARE

## 2024-11-26 VITALS
SYSTOLIC BLOOD PRESSURE: 118 MMHG | WEIGHT: 92.2 LBS | DIASTOLIC BLOOD PRESSURE: 68 MMHG | BODY MASS INDEX: 19.35 KG/M2 | HEIGHT: 58 IN | OXYGEN SATURATION: 98 % | HEART RATE: 79 BPM

## 2024-11-26 DIAGNOSIS — R63.4 WEIGHT LOSS: ICD-10-CM

## 2024-11-26 DIAGNOSIS — G47.00 INSOMNIA, UNSPECIFIED TYPE: ICD-10-CM

## 2024-11-26 DIAGNOSIS — M80.08XA AGE-RELATED OSTEOPOROSIS WITH CURRENT PATHOLOGICAL FRACTURE, VERTEBRA(E), INITIAL ENCOUNTER FOR FRACTURE: Primary | ICD-10-CM

## 2024-11-26 DIAGNOSIS — K21.9 GASTROESOPHAGEAL REFLUX DISEASE, UNSPECIFIED WHETHER ESOPHAGITIS PRESENT: ICD-10-CM

## 2024-11-26 DIAGNOSIS — S22.069S CLOSED FRACTURE OF EIGHTH THORACIC VERTEBRA, UNSPECIFIED FRACTURE MORPHOLOGY, SEQUELA: ICD-10-CM

## 2024-11-26 PROCEDURE — 1160F RVW MEDS BY RX/DR IN RCRD: CPT | Performed by: STUDENT IN AN ORGANIZED HEALTH CARE EDUCATION/TRAINING PROGRAM

## 2024-11-26 PROCEDURE — 1159F MED LIST DOCD IN RCRD: CPT | Performed by: STUDENT IN AN ORGANIZED HEALTH CARE EDUCATION/TRAINING PROGRAM

## 2024-11-26 PROCEDURE — 99213 OFFICE O/P EST LOW 20 MIN: CPT | Performed by: STUDENT IN AN ORGANIZED HEALTH CARE EDUCATION/TRAINING PROGRAM

## 2024-11-26 PROCEDURE — 1125F AMNT PAIN NOTED PAIN PRSNT: CPT | Performed by: STUDENT IN AN ORGANIZED HEALTH CARE EDUCATION/TRAINING PROGRAM

## 2024-11-26 PROCEDURE — G2211 COMPLEX E/M VISIT ADD ON: HCPCS | Performed by: STUDENT IN AN ORGANIZED HEALTH CARE EDUCATION/TRAINING PROGRAM

## 2024-11-26 NOTE — PROGRESS NOTES
"Chief Complaint  Pain    Subjective        Rafaela Cunha presents to [facility]  History of Present Illness    Presents to clinic today for follow-up    Since last visit, she has established with a pain medicine doctor who is planning to do a branch block as well as possible ablation in the future if block comes okay.  She continues to have significant pain, her pain medicine doctor recently adjusted her tramadol dose did extended release and she reports she has had no improvement and in fact is worse from previous dose.    She has stopped Fosamax and reports improvement in her GI symptoms    Objective   Vital Signs:  /68   Pulse 79   Ht 147.3 cm (57.99\")   Wt 41.8 kg (92 lb 3.2 oz)   SpO2 98%   BMI 19.28 kg/m²   Estimated body mass index is 19.28 kg/m² as calculated from the following:    Height as of this encounter: 147.3 cm (57.99\").    Weight as of this encounter: 41.8 kg (92 lb 3.2 oz).    BMI is within normal parameters. No other follow-up for BMI required.      Physical Exam  Vitals reviewed.   Constitutional:       General: She is not in acute distress.     Appearance: Normal appearance. She is not toxic-appearing.   HENT:      Head: Normocephalic and atraumatic.   Eyes:      General: No scleral icterus.     Conjunctiva/sclera: Conjunctivae normal.   Skin:     General: Skin is warm and dry.   Neurological:      Mental Status: She is alert and oriented to person, place, and time.   Psychiatric:         Mood and Affect: Mood normal.         Behavior: Behavior normal.        Result Review :                Assessment and Plan   Diagnoses and all orders for this visit:    1. Age-related osteoporosis with current pathological fracture, vertebra(e), initial encounter for fracture (Primary)    2. Closed fracture of eighth thoracic vertebra, unspecified fracture morphology, sequela    3. Gastroesophageal reflux disease, unspecified whether esophagitis present    4. Weight " loss      Reviewed outside orthopedic pain medicine office visit    Continues to have severe significant pain related to her osteoporosis.  That her upcoming branch block as well as possible radiofrequency ablation for her pain will provide some relief.    We were able to get her scheduled with [facility] spine surgery specialist for further evaluation, Dr. Coburn's office    I did report concern regarding her weight that she is slowly been losing weight that I suspect is all secondary to severe pain.  Recommended she increase her calorie intake as well as try protein supplementation to prevent further weight loss    I will plan to see her back in about 3 months, labs to be done prior         Follow Up   Return in about 3 months (around 2/26/2025).  Patient was given instructions and counseling regarding her condition or for health maintenance advice. Please see specific information pulled into the AVS if appropriate.

## 2024-12-03 ENCOUNTER — TELEPHONE (OUTPATIENT)
Dept: INTERNAL MEDICINE | Facility: CLINIC | Age: 79
End: 2024-12-03
Payer: MEDICARE

## 2024-12-03 NOTE — TELEPHONE ENCOUNTER
Caller: Rafaela Cunha    Relationship: Self    Best call back number: [phone]    What is the best time to reach you: ANYTIME     Who are you requesting to speak with (clinical staff, provider,  specific staff member): CLINICAL     What was the call regarding: PATIENT STATES THAT HER PRESCRIPTION FOR SUMATRIPTAN WAS ONLY WRITTEN FOR A QUANTITY OF 9 TABLETS AND SHE WOULD LIKE TO KNOW IF HER PCP WOULD REACH OUT TO HER INSURANCE COMPANY AND TRY TO GET APPROVED FOR MORE THAN 9 TABLETS A MONTH.     PLEASE ADVISE

## 2024-12-11 DIAGNOSIS — G47.00 INSOMNIA, UNSPECIFIED TYPE: ICD-10-CM

## 2024-12-11 RX ORDER — ZOLPIDEM TARTRATE 10 MG/1
5 TABLET ORAL NIGHTLY PRN
Qty: 90 TABLET | Refills: 0 | Status: SHIPPED | OUTPATIENT
Start: 2024-12-11

## 2024-12-11 NOTE — TELEPHONE ENCOUNTER
Caller: Rafaela Cunha    Relationship: Self    Best call back number: [phone]    Requested Prescriptions:   Requested Prescriptions     Pending Prescriptions Disp Refills    zolpidem (AMBIEN) 10 MG tablet 90 tablet 0     Sig: Take 0.5 tablets by mouth At Night As Needed for Sleep.        Pharmacy where request should be sent: [facility] #[id number] [address] AT [facility] [phone] PH - [phone]      Last office visit with prescribing clinician: 11/26/2024   Last telemedicine visit with prescribing clinician: Visit date not found   Next office visit with prescribing clinician: 2/26/2025     Additional details provided by patient: WILL NEED SENT TO THE NEW PHARMACY , LESS THAN 3 DAYS    Does the patient have less than a 3 day supply:  [x] Yes  [] No    Would you like a call back once the refill request has been completed: [] Yes [x] No    If the office needs to give you a call back, can they leave a voicemail: [] Yes [x] No    Jocelyn Sierra   12/11/24 10:56 EST

## 2024-12-17 ENCOUNTER — TELEPHONE (OUTPATIENT)
Dept: INTERNAL MEDICINE | Facility: CLINIC | Age: 79
End: 2024-12-17
Payer: MEDICARE

## 2024-12-17 DIAGNOSIS — G47.00 INSOMNIA, UNSPECIFIED TYPE: ICD-10-CM

## 2024-12-17 RX ORDER — ZOLPIDEM TARTRATE 10 MG/1
5 TABLET ORAL NIGHTLY PRN
Qty: 15 TABLET | Refills: 1 | Status: SHIPPED | OUTPATIENT
Start: 2024-12-17

## 2024-12-17 NOTE — TELEPHONE ENCOUNTER
Caller: Rafaela Cunha    Relationship: Self    Best call back number: [phone]    Which medication are you concerned about: ZOLPIDEM     What are your concerns: PATIENT STATES THAT WHEN THIS WAS WRITTEN FOR A QUANTITY OF 15, HER INSURANCE WOULD PAY FOR THIS. PATIENT WOULD LIKE FOR THE NEXT PRESCRIPTION TO BE WRITTEN THIS WAY AND HAVE A REFILL ON IT. PLEASE ADVISE PATIENT ASAP.    PATIENT NEEDS THIS TO BE [id number] [place] [id number] [facility] - [phone] [facility] [phone]  [phone]  ASAP WITH THESE CORRECTIONS.

## 2024-12-19 ENCOUNTER — HOSPITAL ENCOUNTER (OUTPATIENT)
Facility: HOSPITAL | Age: 79
Discharge: HOME | End: 2024-12-19
Admitting: INTERNAL MEDICINE
Payer: MEDICARE

## 2024-12-19 ENCOUNTER — OFFICE VISIT (OUTPATIENT)
Dept: INTERNAL MEDICINE | Facility: CLINIC | Age: 79
End: 2024-12-19
Payer: MEDICARE

## 2024-12-19 VITALS
OXYGEN SATURATION: 98 % | HEIGHT: 58 IN | TEMPERATURE: 98.2 F | DIASTOLIC BLOOD PRESSURE: 80 MMHG | WEIGHT: 93 LBS | SYSTOLIC BLOOD PRESSURE: 132 MMHG | BODY MASS INDEX: 19.52 KG/M2

## 2024-12-19 DIAGNOSIS — R68.83 CHILLS: Primary | ICD-10-CM

## 2024-12-19 DIAGNOSIS — R61 EXCESSIVE SWEATING: ICD-10-CM

## 2024-12-19 PROCEDURE — 1125F AMNT PAIN NOTED PAIN PRSNT: CPT | Performed by: INTERNAL MEDICINE

## 2024-12-19 PROCEDURE — 99213 OFFICE O/P EST LOW 20 MIN: CPT | Performed by: INTERNAL MEDICINE

## 2024-12-19 PROCEDURE — 71046 X-RAY EXAM CHEST 2 VIEWS: CPT

## 2024-12-19 NOTE — PROGRESS NOTES
Subjective        Chief Complaint   Patient presents with    Chills    Fever    Generalized Body Aches           Rafaela Cunha is a 79 y.o. female who presents for    Patient Active Problem List   Diagnosis    Closed fracture of thoracic vertebra    Compression fracture of pelvis with routine healing    Insomnia    Low back pain    Osteoporosis       History of Present Illness     She has been sick for 2 weeks. She is achy for 2 weeks. Her temp has been up to 99. She had a negative COVID test a week ago. She is not coughing. Denies swollen glands, rashes, diarrhea, hematuria or dysuria. She has minimal throat soreness. Her ears don't feel nl. Her  was sick but for only for 2 days.    Allergies   Allergen Reactions    Ketorolac Tromethamine Other (See Comments)    Romosozumab Other (See Comments)    Abaloparatide Other (See Comments)    Codeine Other (See Comments)    Parathyroid Hormone (Recomb) Other (See Comments)    Raloxifene Other (See Comments)    Teriparatide Unknown - High Severity       Current Outpatient Medications on File Prior to Visit   Medication Sig Dispense Refill    acyclovir (ZOVIRAX) 5 % ointment APPLY TO THE AFFECTED AREA(S) BY TOPICAL ROUTE EVERY 3 HOURS 6 TIMES PER DAY      Calcium Citrate 150 MG capsule Take 250 mg by mouth.      Cholecalciferol 25 MCG (1000 UT) tablet Take 2 tablets by mouth Daily.      desoximetasone (TOPICORT) 0.25 % cream APPLY A THIN LAYER TO THE AFFECTED AREA(S) BY TOPICAL ROUTE 2 TIMES PER DAY ; RUB IN GENTLY AND COMPLETELY      HYDROcodone-acetaminophen (Norco) 5-325 MG per tablet Take 1 tablet by mouth Every 4 (Four) Hours As Needed for Severe Pain. 60 tablet 0    MAGNESIUM GLYCINATE PO Take  by mouth.      mupirocin (BACTROBAN) 2 % ointment       SUMAtriptan (IMITREX) 100 MG tablet Take one tablet at onset of headache. May repeat dose one time in 2 hours if headache not relieved. 90 tablet 0    traMADol (ULTRAM) 50 MG tablet Take 2 tablets by mouth Every 12  (Twelve) Hours As Needed for Severe Pain. 180 tablet 0    zolpidem (AMBIEN) 10 MG tablet Take 0.5 tablets by mouth At Night As Needed for Sleep. 15 tablet 1     No current facility-administered medications on file prior to visit.       Past Medical History:   Diagnosis Date    Allergic 1954    Cataract 2018    Colon polyp 2017    Headache 1957    HL (hearing loss) 2023    Low back pain 2017    Osteopenia 2013    Scoliosis not sure    Visual impairment 1987       Past Surgical History:   Procedure Laterality Date    ADENOIDECTOMY  1950    COLONOSCOPY  2022    TONSILLECTOMY  1950       Family History   Problem Relation Age of Onset    Alcohol abuse Father     Cancer Father     Hyperlipidemia Father        Social History     Socioeconomic History    Marital status:    Tobacco Use    Smoking status: Never     Passive exposure: Never    Smokeless tobacco: Never   Vaping Use    Vaping status: Never Used   Substance and Sexual Activity    Alcohol use: Not Currently     Comment: RARE    Drug use: Never    Sexual activity: Not Currently     Partners: Male           The following portions of the patient's history were reviewed and updated as appropriate: problem list, allergies, current medications, past medical history, past family history, past social history, and past surgical history.    Review of Systems    Immunization History   Administered Date(s) Administered    COVID-19 (MODERNA) 1st,2nd,3rd Dose Monovalent 02/01/2021, 03/03/2021, 11/03/2021    COVID-19 (MODERNA) BIVALENT 12+YRS 11/02/2022, 06/14/2023    COVID-19 (MODERNA) Monovalent Original Booster 04/13/2022    COVID-19 (PFIZER) 12YRS+ (COMIRNATY) 11/15/2023, 11/08/2024    Fluzone High-Dose 65+YRS 09/17/2024    Fluzone High-Dose 65+yrs 10/08/2020, 10/07/2021, 10/18/2022, 11/15/2023    Pneumococcal Conjugate 13-Valent (PCV13) 09/05/2018    Pneumococcal Polysaccharide (PPSV23) 03/20/2014    Shingrix 03/30/2018, 07/03/2019    Tdap 09/05/2018    Zostavax  "02/05/2010       Objective   Vitals:    12/19/24 1555   BP: 132/80   Temp: 98.2 °F (36.8 °C)   SpO2: 98%   Weight: 42.2 kg (93 lb)   Height: 147.3 cm (57.99\")     Body mass index is 19.44 kg/m².  Physical Exam  Vitals reviewed.   Constitutional:       Appearance: She is well-developed.   HENT:      Head: Normocephalic and atraumatic.      Right Ear: Tympanic membrane and ear canal normal.      Left Ear: Tympanic membrane and ear canal normal.      Mouth/Throat:      Mouth: Mucous membranes are moist.      Pharynx: Oropharynx is clear.   Cardiovascular:      Rate and Rhythm: Normal rate and regular rhythm.      Heart sounds: Normal heart sounds, S1 normal and S2 normal.   Pulmonary:      Effort: Pulmonary effort is normal.      Breath sounds: Normal breath sounds.   Abdominal:      Palpations: Abdomen is soft. There is no hepatomegaly or splenomegaly.   Lymphadenopathy:      Cervical: No cervical adenopathy.   Skin:     General: Skin is warm.   Neurological:      Mental Status: She is alert.   Psychiatric:         Behavior: Behavior normal.         Procedures    Assessment & Plan   Diagnoses and all orders for this visit:    1. Chills (Primary)  -     CBC & Differential  -     Comprehensive Metabolic Panel  -     T4, Free  -     TSH  -     Urinalysis With Culture If Indicated -  -     XR Chest PA & Lateral    2. Excessive sweating  -     CBC & Differential  -     Comprehensive Metabolic Panel  -     T4, Free  -     TSH  -     Urinalysis With Culture If Indicated -  -     XR Chest PA & Lateral      Get labs and CXR.             Return for Lab Today.  "

## 2024-12-20 ENCOUNTER — LAB (OUTPATIENT)
Facility: HOSPITAL | Age: 79
End: 2024-12-20
Payer: MEDICARE

## 2024-12-20 DIAGNOSIS — R80.9 PROTEINURIA, UNSPECIFIED TYPE: Primary | ICD-10-CM

## 2024-12-20 LAB
ALBUMIN SERPL-MCNC: 4.2 G/DL (ref 3.5–5.2)
ALBUMIN/GLOB SERPL: 1.6 G/DL
ALP SERPL-CCNC: 52 U/L (ref 39–117)
ALT SERPL W P-5'-P-CCNC: 11 U/L (ref 1–33)
ANION GAP SERPL CALCULATED.3IONS-SCNC: 10.1 MMOL/L (ref 5–15)
AST SERPL-CCNC: 19 U/L (ref 1–32)
BACTERIA UR QL AUTO: ABNORMAL /HPF
BASOPHILS # BLD AUTO: 0.03 10*3/MM3 (ref 0–0.2)
BASOPHILS NFR BLD AUTO: 0.6 % (ref 0–1.5)
BILIRUB SERPL-MCNC: 0.4 MG/DL (ref 0–1.2)
BILIRUB UR QL STRIP: NEGATIVE
BUN SERPL-MCNC: 21 MG/DL (ref 8–23)
BUN/CREAT SERPL: 26.3 (ref 7–25)
CALCIUM SPEC-SCNC: 9.7 MG/DL (ref 8.6–10.5)
CHLORIDE SERPL-SCNC: 105 MMOL/L (ref 98–107)
CLARITY UR: CLEAR
CO2 SERPL-SCNC: 26.9 MMOL/L (ref 22–29)
COD CRY URNS QL: ABNORMAL /HPF
COLOR UR: ABNORMAL
CREAT SERPL-MCNC: 0.8 MG/DL (ref 0.57–1)
DEPRECATED RDW RBC AUTO: 42.2 FL (ref 37–54)
EGFRCR SERPLBLD CKD-EPI 2021: 76 ML/MIN/1.73
EOSINOPHIL # BLD AUTO: 0.16 10*3/MM3 (ref 0–0.4)
EOSINOPHIL NFR BLD AUTO: 3.1 % (ref 0.3–6.2)
ERYTHROCYTE [DISTWIDTH] IN BLOOD BY AUTOMATED COUNT: 12.3 % (ref 12.3–15.4)
GLOBULIN UR ELPH-MCNC: 2.7 GM/DL
GLUCOSE SERPL-MCNC: 71 MG/DL (ref 65–99)
GLUCOSE UR STRIP-MCNC: NEGATIVE MG/DL
HCT VFR BLD AUTO: 43.2 % (ref 34–46.6)
HGB BLD-MCNC: 14.5 G/DL (ref 12–15.9)
HGB UR QL STRIP.AUTO: NEGATIVE
HOLD SPECIMEN: NORMAL
HYALINE CASTS UR QL AUTO: ABNORMAL /LPF
IMM GRANULOCYTES # BLD AUTO: 0.01 10*3/MM3 (ref 0–0.05)
IMM GRANULOCYTES NFR BLD AUTO: 0.2 % (ref 0–0.5)
KETONES UR QL STRIP: ABNORMAL
LEUKOCYTE ESTERASE UR QL STRIP.AUTO: NEGATIVE
LYMPHOCYTES # BLD AUTO: 1.7 10*3/MM3 (ref 0.7–3.1)
LYMPHOCYTES NFR BLD AUTO: 32.6 % (ref 19.6–45.3)
MCH RBC QN AUTO: 31.3 PG (ref 26.6–33)
MCHC RBC AUTO-ENTMCNC: 33.6 G/DL (ref 31.5–35.7)
MCV RBC AUTO: 93.3 FL (ref 79–97)
MONOCYTES # BLD AUTO: 0.44 10*3/MM3 (ref 0.1–0.9)
MONOCYTES NFR BLD AUTO: 8.4 % (ref 5–12)
MUCOUS THREADS URNS QL MICRO: ABNORMAL /HPF
NEUTROPHILS NFR BLD AUTO: 2.88 10*3/MM3 (ref 1.7–7)
NEUTROPHILS NFR BLD AUTO: 55.1 % (ref 42.7–76)
NITRITE UR QL STRIP: NEGATIVE
NRBC BLD AUTO-RTO: 0 /100 WBC (ref 0–0.2)
PH UR STRIP.AUTO: 6 [PH] (ref 5–8)
PLATELET # BLD AUTO: 206 10*3/MM3 (ref 140–450)
PMV BLD AUTO: 11.5 FL (ref 6–12)
POTASSIUM SERPL-SCNC: 3.7 MMOL/L (ref 3.5–5.2)
PROT SERPL-MCNC: 6.9 G/DL (ref 6–8.5)
PROT UR QL STRIP: ABNORMAL
RBC # BLD AUTO: 4.63 10*6/MM3 (ref 3.77–5.28)
RBC # UR STRIP: ABNORMAL /HPF
REF LAB TEST METHOD: ABNORMAL
SODIUM SERPL-SCNC: 142 MMOL/L (ref 136–145)
SP GR UR STRIP: 1.03 (ref 1–1.03)
SQUAMOUS #/AREA URNS HPF: ABNORMAL /HPF
T4 FREE SERPL-MCNC: 1.28 NG/DL (ref 0.92–1.68)
TSH SERPL DL<=0.05 MIU/L-ACNC: 0.84 UIU/ML (ref 0.27–4.2)
UROBILINOGEN UR QL STRIP: ABNORMAL
WBC # UR STRIP: ABNORMAL /HPF
WBC NRBC COR # BLD AUTO: 5.22 10*3/MM3 (ref 3.4–10.8)

## 2024-12-20 PROCEDURE — 85025 COMPLETE CBC W/AUTO DIFF WBC: CPT | Performed by: INTERNAL MEDICINE

## 2024-12-20 PROCEDURE — 81001 URINALYSIS AUTO W/SCOPE: CPT | Performed by: INTERNAL MEDICINE

## 2024-12-20 PROCEDURE — 84439 ASSAY OF FREE THYROXINE: CPT | Performed by: INTERNAL MEDICINE

## 2024-12-20 PROCEDURE — 36415 COLL VENOUS BLD VENIPUNCTURE: CPT | Performed by: INTERNAL MEDICINE

## 2024-12-20 PROCEDURE — 84443 ASSAY THYROID STIM HORMONE: CPT | Performed by: INTERNAL MEDICINE

## 2024-12-20 PROCEDURE — 80053 COMPREHEN METABOLIC PANEL: CPT | Performed by: INTERNAL MEDICINE

## 2024-12-23 ENCOUNTER — HOSPITAL ENCOUNTER (OUTPATIENT)
Dept: CT IMAGING | Facility: HOSPITAL | Age: 79
Discharge: HOME | End: 2024-12-23
Admitting: INTERNAL MEDICINE
Payer: MEDICARE

## 2024-12-23 DIAGNOSIS — R93.89 ABNORMAL CXR: Primary | ICD-10-CM

## 2024-12-23 DIAGNOSIS — R93.89 ABNORMAL CXR: ICD-10-CM

## 2024-12-23 PROCEDURE — 71250 CT THORAX DX C-: CPT

## 2024-12-30 ENCOUNTER — TELEPHONE (OUTPATIENT)
Dept: INTERNAL MEDICINE | Facility: CLINIC | Age: 79
End: 2024-12-30
Payer: MEDICARE

## 2024-12-30 ENCOUNTER — LAB (OUTPATIENT)
Facility: HOSPITAL | Age: 79
End: 2024-12-30
Payer: MEDICARE

## 2024-12-30 ENCOUNTER — TREATMENT (OUTPATIENT)
Dept: PHYSICAL THERAPY | Facility: CLINIC | Age: 79
End: 2024-12-30
Payer: MEDICARE

## 2024-12-30 DIAGNOSIS — J45.991 COUGH VARIANT ASTHMA: ICD-10-CM

## 2024-12-30 DIAGNOSIS — S22.009G CLOSED FRACTURE OF MULTIPLE THORACIC VERTEBRAE WITH DELAYED HEALING, SUBSEQUENT ENCOUNTER: Primary | ICD-10-CM

## 2024-12-30 DIAGNOSIS — Z78.9 DIFFICULTY WITH ACTIVITIES OF DAILY LIVING: ICD-10-CM

## 2024-12-30 DIAGNOSIS — R68.83 CHILLS: Primary | ICD-10-CM

## 2024-12-30 DIAGNOSIS — R68.83 CHILLS: ICD-10-CM

## 2024-12-30 DIAGNOSIS — Z98.890 H/O KYPHOPLASTY: ICD-10-CM

## 2024-12-30 DIAGNOSIS — K21.9 GASTROESOPHAGEAL REFLUX DISEASE, UNSPECIFIED WHETHER ESOPHAGITIS PRESENT: ICD-10-CM

## 2024-12-30 DIAGNOSIS — M80.08XA AGE-RELATED OSTEOPOROSIS WITH CURRENT PATHOLOGICAL FRACTURE, VERTEBRA(E), INITIAL ENCOUNTER FOR FRACTURE: ICD-10-CM

## 2024-12-30 LAB
B PARAPERT DNA SPEC QL NAA+PROBE: NOT DETECTED
B PERT DNA SPEC QL NAA+PROBE: NOT DETECTED
BASOPHILS # BLD AUTO: 0.03 10*3/MM3 (ref 0–0.2)
BASOPHILS NFR BLD AUTO: 0.4 % (ref 0–1.5)
C PNEUM DNA NPH QL NAA+NON-PROBE: NOT DETECTED
DEPRECATED RDW RBC AUTO: 42.5 FL (ref 37–54)
EOSINOPHIL # BLD AUTO: 0.08 10*3/MM3 (ref 0–0.4)
EOSINOPHIL NFR BLD AUTO: 1 % (ref 0.3–6.2)
ERYTHROCYTE [DISTWIDTH] IN BLOOD BY AUTOMATED COUNT: 12.6 % (ref 12.3–15.4)
FLUAV SUBTYP SPEC NAA+PROBE: NOT DETECTED
FLUBV RNA ISLT QL NAA+PROBE: NOT DETECTED
HADV DNA SPEC NAA+PROBE: NOT DETECTED
HCOV 229E RNA SPEC QL NAA+PROBE: NOT DETECTED
HCOV HKU1 RNA SPEC QL NAA+PROBE: NOT DETECTED
HCOV NL63 RNA SPEC QL NAA+PROBE: NOT DETECTED
HCOV OC43 RNA SPEC QL NAA+PROBE: NOT DETECTED
HCT VFR BLD AUTO: 41.8 % (ref 34–46.6)
HGB BLD-MCNC: 14.3 G/DL (ref 12–15.9)
HMPV RNA NPH QL NAA+NON-PROBE: NOT DETECTED
HPIV1 RNA ISLT QL NAA+PROBE: NOT DETECTED
HPIV2 RNA SPEC QL NAA+PROBE: NOT DETECTED
HPIV3 RNA NPH QL NAA+PROBE: NOT DETECTED
HPIV4 P GENE NPH QL NAA+PROBE: NOT DETECTED
IMM GRANULOCYTES # BLD AUTO: 0.03 10*3/MM3 (ref 0–0.05)
IMM GRANULOCYTES NFR BLD AUTO: 0.4 % (ref 0–0.5)
LYMPHOCYTES # BLD AUTO: 1.53 10*3/MM3 (ref 0.7–3.1)
LYMPHOCYTES NFR BLD AUTO: 20 % (ref 19.6–45.3)
M PNEUMO IGG SER IA-ACNC: NOT DETECTED
MCH RBC QN AUTO: 31.5 PG (ref 26.6–33)
MCHC RBC AUTO-ENTMCNC: 34.2 G/DL (ref 31.5–35.7)
MCV RBC AUTO: 92.1 FL (ref 79–97)
MONOCYTES # BLD AUTO: 0.8 10*3/MM3 (ref 0.1–0.9)
MONOCYTES NFR BLD AUTO: 10.4 % (ref 5–12)
NEUTROPHILS NFR BLD AUTO: 5.19 10*3/MM3 (ref 1.7–7)
NEUTROPHILS NFR BLD AUTO: 67.8 % (ref 42.7–76)
NRBC BLD AUTO-RTO: 0 /100 WBC (ref 0–0.2)
PLATELET # BLD AUTO: 196 10*3/MM3 (ref 140–450)
PMV BLD AUTO: 11.9 FL (ref 6–12)
RBC # BLD AUTO: 4.54 10*6/MM3 (ref 3.77–5.28)
RHINOVIRUS RNA SPEC NAA+PROBE: NOT DETECTED
RSV RNA NPH QL NAA+NON-PROBE: NOT DETECTED
SARS-COV-2 RNA NPH QL NAA+NON-PROBE: NOT DETECTED
WBC NRBC COR # BLD AUTO: 7.66 10*3/MM3 (ref 3.4–10.8)

## 2024-12-30 PROCEDURE — 36415 COLL VENOUS BLD VENIPUNCTURE: CPT

## 2024-12-30 PROCEDURE — 85025 COMPLETE CBC W/AUTO DIFF WBC: CPT

## 2024-12-30 PROCEDURE — 97163 PT EVAL HIGH COMPLEX 45 MIN: CPT | Performed by: PHYSICAL THERAPIST

## 2024-12-30 PROCEDURE — 0202U NFCT DS 22 TRGT SARS-COV-2: CPT

## 2024-12-30 PROCEDURE — 97110 THERAPEUTIC EXERCISES: CPT | Performed by: PHYSICAL THERAPIST

## 2024-12-30 NOTE — PROGRESS NOTES
[facility]  [facility]  [address] [place] [address]    Initial Evaluation and Plan of Care      Patient: Rafaela Cunha   : 1945  Diagnosis/ICD-10 Code:  Closed fracture of multiple thoracic vertebrae with delayed healing, subsequent encounter [S22.009G]  Referring practitioner: Hayes Oneill MD  Date of Initial Visit: 2024  Today's Date: 2024  Patient seen for 1 session         Visit Diagnoses:    ICD-10-CM ICD-9-CM   1. Closed fracture of multiple thoracic vertebrae with delayed healing, subsequent encounter  S22.009G V54.17   2. H/O kyphoplasty  Z98.890 V45.89   3. Difficulty with activities of daily living  Z78.9 V49.89         Subjective Questionnaire: Oswestry: 62%      Subjective Evaluation    History of Present Illness  Mechanism of injury: 79 year old with 7 year hx of lumbar then thoracic compression fractures. Prior PT using TENS, US, massage and heat.   Rest and therapy in the past seemed to help until about 2 years later when she suffered comp fx at T7 and T10.  Kyphoplasty did not seem to help per pt.   Osteoporosis dx in her 40s but kept walking and running a significant number of miles per day. Had to stop after stress fx at both tib and fibula.   Moved to [place] for a few years where she felt better. Unable to take variety of osteoporosis drugs. Now will work with pain management clinic for pain patches but fearful of narcotics. Open to aquatic therapy.   Feels like she ruined the skin on her back from living on her heating pad.   See MRI taken 10-4-24 with multiple comp fx and vertebral height loss. X-rays taken 2024 with 19 degree scoliosis.   No fall or MVA. Pt feels part of her pain was due to gradually packing up her life and art gallery the past 18 months.   Pt thinks T9 is a new fx.   No plans or need for future spinal fusions or surgical interventions.   Does wear a lumbar or Jewitt brace prn. Also a posture garment she  wears prn.   Pt has also been sick the past 5 weeks with body aches she thinks might be the flu.   Did try one session of PT on 24 for eval and 3 home ex but pt wanting modalities so she did not return for rx.   Pt does keep up with some home ex but admits she might push thru pain too much. Admits prior tendency to push herself when running causing the stress fx.   Now hopes to return to walking an hour with no increase in pain.       Patient Occupation: retired  age 78 Quality of life: poor    Pain  Current pain ratin  At best pain ratin  At worst pain rating: 10  Location: L thoracic with some radiation to her stomach. constant.  Quality: discomfort  Relieving factors: change in position, heat and rest  Aggravating factors: prolonged positioning, standing, movement and lifting  Progression: improved    Social Support  Lives in: condominium  Lives with: spouse    Diagnostic Tests  X-ray: abnormal  MRI studies: abnormal    Treatments  Previous treatment: physical therapy  Patient Goals  Patient goals for therapy: decreased pain, return to sport/leisure activities and increased motion  Patient goal: get pain levels down to 3-4/10. enjoy walking for fitness         Objective          Static Posture     Thoracic Spine  Convex curve left and hyperkyphosis.    Lumbar Spine   Flattened.     Palpation   Left   Hypertonic in the erector spinae.   Tenderness of the erector spinae.     Neurological Testing     Sensation     Lumbar   Left   Intact: light touch    Right   Intact: light touch    Active Range of Motion   Cervical/Thoracic Spine     Thoracic   Left rotation: 20 degrees   Right rotation: 20 degrees   Left Shoulder   Flexion: 150 degrees with pain    Right Shoulder   Flexion: 160 degrees     Lumbar   Flexion: 20 degrees   Extension: 5 degrees   Left lateral flexion: 10 degrees   Right lateral flexion: 10 degrees   Left Hip   Flexion: WFL  External rotation (90/90): 25 degrees with  pain    Right Hip   Flexion: WFL  External rotation (90/90): 35 degrees     Additional Active Range of Motion Details  States she doesn't want to flex more than 20 degrees due to hx of comp fx and precautions per surgeons and therapists. Uses a golfers lift or lunge stretch to get things off the ground.     Strength/Myotome Testing     Left Shoulder     Planes of Motion   Flexion: 4-     Right Shoulder     Planes of Motion   Flexion: 4     Ambulation   Weight-Bearing Status   Assistive device used: none    Ambulation: Stairs   Ascend stairs: independent  Pattern: reciprocal  Railings: one rail  Descend stairs: independent  Pattern: reciprocal  Railings: one rail          Assessment & Plan       Assessment  Impairments: abnormal or restricted ROM, activity intolerance, impaired physical strength and lacks appropriate home exercise program   Functional limitations: carrying objects, lifting, walking, pulling, pushing and uncomfortable because of pain   Assessment details: Pt with evolving condition planning to work with pain management but not wanting meds or pain patches so not sure what her plan is at this time.  Pt interested in aquatic therapy but gets hot very easily thus wanting to try cooler water first.   Pt with co-morbidity of being only 93# with ideal body wt around 97 per pt. Guarding spinal flexion due to multiple comp fx with most recent around this past September at her T9 level, per pt, but MRI refers to T11 and T8 has having significant compression, though not acute.   Pt will benefit from a combination of aquatic and land based therapy. Muscle strain along L TL paraspinal can benefit from modalities and manual therapy. Osteoporosis is significant per patient thus will progress slowly with weight training.   Prognosis: fair  Prognosis details: Access Code: [email or web address]  URL: [email or web address]  Date: 12/30/2024  Prepared by: Zorre Kimura    Exercises  - Supine March  - 1 x daily - 7 x weekly  - 2 sets - 10 reps  - Supine Piriformis Stretch with Foot on Ground  - 1 x daily - 7 x weekly - 2 sets - 30sec hold  - Supine Single Bent Knee Fallout  - 1 x daily - 7 x weekly - 10 reps - 5sec hold  - Supine Hamstring Stretch  - 1 x daily - 7 x weekly - 2 sets - 1m hold  - Supine Shoulder Flexion Extension Full Range AROM  - 1 x daily - 7 x weekly - 10 reps - 10sec hold    Goals  Plan Goals: STGs 4 weeks:  1. Pt to be indep in variety of land based ex for gym and home setting  2. Pt to be indep in variety of aquatic ex for ongoing indep ex  3. Pt to state she can walk 20-30 minutes with no increase in back pain  4. JOSÉ score to improve from 62 to < 54%  LTGs 12 weeks:  1. Pt to be able to walk 40 or more minutes with no increase in pain 24 hours post ex  2. L shoulder ROM to improve from 140 to 155 degrees of flexion.  3. UE strength to improve from 4- to 4+ lifting light weights and decreasing chance of spinal comp fx  4. JOSÉ score < 42%    Plan  Therapy options: will be seen for skilled therapy services  Planned modality interventions: ultrasound and electrical stimulation/Russian stimulation  Other planned modality interventions: cupping  Planned therapy interventions: manual therapy, abdominal trunk stabilization, neuromuscular re-education, postural training, strengthening, stretching, therapeutic activities, home exercise program and functional ROM exercises  Other planned therapy interventions: aquatic therapy, group therapy  Frequency: 2x week  Duration in weeks: 12  Treatment plan discussed with: patient    History # of Personal Factors and/or Comorbidities: HIGH (3+)  Examination of Body System(s): # of elements: MODERATE (3)  Clinical Presentation: EVOLVING  Clinical Decision Making: HIGH      Timed:         Manual Therapy:         mins  72773;     Therapeutic Exercise:   15      mins  25382;     Neuromuscular Ann:        mins  50610;    Therapeutic Activity:          mins  07844;     Gait Training:            mins  63635;     Ultrasound:          mins  85649;    Ionto                                   mins   31309  Self Care                            mins   80922  Canalith Repos         mins 06643  Aquatic Exercise                 mins 55215    Un-Timed:  Electrical Stimulation:         mins  74775 ( );  Dry Needling          mins self-pay  Traction          mins 81392    Low Eval          Mins  88637  Mod Eval          Mins  18932  High Eval                       30    Mins  51705        Timed Treatment:  15    mins   Total Treatment:     45   mins          PT: Zorre Zeno Kimura, PT     [place] License Number: [id number]  [place] License Number:  [id number]  Electronically signed by Zorre Zeno Kimura, PT, 12/30/24, 12:57 PM EST    Certification Period: 12/30/2024 thru 3/29/2025  I certify that the therapy services are furnished while this patient is under my care.  The services outlined above are required by this patient, and will be reviewed every 90 days.         Physician Signature:__________________________________________________    PHYSICIAN: Hayes Oneill MD  NPI: [id number]                                      DATE:      Please sign and return via fax to [employer]  [address] [place] [address]. [phone]  Thank you, [facility].

## 2024-12-30 NOTE — TELEPHONE ENCOUNTER
Pt called in stating that she is still not feeling better.She was told to call back if symptoms persists. She is sweating, has a sore throat, body aches, chills(freezing), congestion and she can't sleep. Symptoms have been present for 4 weeks. her call back number is  [phone]

## 2024-12-30 NOTE — TELEPHONE ENCOUNTER
Could you call and let her know that I would like her to come get a respiratory panel to swab for other viruses that could cause this? She can get this as early as today and I can try and see her later this week

## 2024-12-31 ENCOUNTER — TELEPHONE (OUTPATIENT)
Dept: INTERNAL MEDICINE | Facility: CLINIC | Age: 79
End: 2024-12-31
Payer: MEDICARE

## 2024-12-31 NOTE — TELEPHONE ENCOUNTER
Caller: Rafaela Cunha    Relationship to patient: Self    Best call back number: [phone]      Patient is needing:   PATIENT CALLED IN STATING SHE HAS AN APPOINTMENT AT 8 AM ON 1.3.24 BUT WAS TOLD IT WAS SCHEDULED FOR 4 PM ON 1.3.24. SHE SAID SHE HAD THE CHOICE BETWEEN 8 AM OR 4 PM AND SHE CHOSE THE 4 PM.   SHE NEEDS TO BE SEEN ON FRIDAY BUT CANNOT MAKE THE 8 AM APPOINTMENT.   PATIENT ALSO WENT TO GET LABS DONE ON 12.20.24.     PLEASE CALL PATIENT TO SCHEDULE.

## 2025-01-03 ENCOUNTER — OFFICE VISIT (OUTPATIENT)
Dept: INTERNAL MEDICINE | Facility: CLINIC | Age: 80
End: 2025-01-03
Payer: MEDICARE

## 2025-01-03 VITALS
SYSTOLIC BLOOD PRESSURE: 106 MMHG | TEMPERATURE: 97.3 F | WEIGHT: 93.8 LBS | HEART RATE: 83 BPM | BODY MASS INDEX: 19.69 KG/M2 | OXYGEN SATURATION: 98 % | DIASTOLIC BLOOD PRESSURE: 72 MMHG | HEIGHT: 58 IN

## 2025-01-03 DIAGNOSIS — B34.9 VIRAL INFECTION: Primary | ICD-10-CM

## 2025-01-03 PROCEDURE — 1125F AMNT PAIN NOTED PAIN PRSNT: CPT | Performed by: STUDENT IN AN ORGANIZED HEALTH CARE EDUCATION/TRAINING PROGRAM

## 2025-01-03 PROCEDURE — 1160F RVW MEDS BY RX/DR IN RCRD: CPT | Performed by: STUDENT IN AN ORGANIZED HEALTH CARE EDUCATION/TRAINING PROGRAM

## 2025-01-03 PROCEDURE — 99213 OFFICE O/P EST LOW 20 MIN: CPT | Performed by: STUDENT IN AN ORGANIZED HEALTH CARE EDUCATION/TRAINING PROGRAM

## 2025-01-03 PROCEDURE — G2211 COMPLEX E/M VISIT ADD ON: HCPCS | Performed by: STUDENT IN AN ORGANIZED HEALTH CARE EDUCATION/TRAINING PROGRAM

## 2025-01-03 PROCEDURE — 1159F MED LIST DOCD IN RCRD: CPT | Performed by: STUDENT IN AN ORGANIZED HEALTH CARE EDUCATION/TRAINING PROGRAM

## 2025-01-06 NOTE — PROGRESS NOTES
"Chief Complaint  Fatigue    Subjective        Rafaela Cunha presents to [facility]  Fatigue  Symptoms include fatigue.        Presents to clinic today for follow-up    She was seen about 2 weeks ago with complaints of generalized fatigue, malaise and chills.  She had extensive workup with chest x-ray, CT scan and labs that were ultimately unremarkable, has also since had respiratory panel that was negative for any viral etiology    She states that she continues to feel this has been going on for the past 5 weeks over over the last week or 2 she has felt improved.  She has like she is 40% back to her baseline    Objective   Vital Signs:  /72   Pulse 83   Temp 97.3 °F (36.3 °C) (Temporal)   Ht 147.3 cm (57.98\")   Wt 42.5 kg (93 lb 12.8 oz)   SpO2 98%   BMI 19.62 kg/m²   Estimated body mass index is 19.62 kg/m² as calculated from the following:    Height as of this encounter: 147.3 cm (57.98\").    Weight as of this encounter: 42.5 kg (93 lb 12.8 oz).    BMI is within normal parameters. No other follow-up for BMI required.      Physical Exam  Vitals reviewed.   Constitutional:       General: She is not in acute distress.     Appearance: Normal appearance. She is not toxic-appearing.   HENT:      Head: Normocephalic and atraumatic.   Eyes:      General: No scleral icterus.     Conjunctiva/sclera: Conjunctivae normal.   Cardiovascular:      Rate and Rhythm: Normal rate and regular rhythm.   Pulmonary:      Effort: Pulmonary effort is normal. No respiratory distress.      Breath sounds: Normal breath sounds. No wheezing.   Skin:     General: Skin is warm and dry.   Neurological:      Mental Status: She is alert and oriented to person, place, and time.   Psychiatric:         Mood and Affect: Mood normal.         Behavior: Behavior normal.        Result Review :                Assessment and Plan   Diagnoses and all orders for this visit:    1. Viral infection (Primary)      Strongly " suspect she has prolonged recovery from viral infection. No localizing symptoms and reviewed previous Dr. Gramajo OV, labs and imaging obtained since last visit     Advised if still having nonspecific symptoms can consider further testing such as EBV among others     RTC as previously scheduled or sooner prn          Follow Up   No follow-ups on file.  Patient was given instructions and counseling regarding her condition or for health maintenance advice. Please see specific information pulled into the AVS if appropriate.

## 2025-01-09 ENCOUNTER — TELEPHONE (OUTPATIENT)
Dept: INTERNAL MEDICINE | Facility: CLINIC | Age: 80
End: 2025-01-09
Payer: MEDICARE

## 2025-01-09 DIAGNOSIS — G47.00 INSOMNIA, UNSPECIFIED TYPE: ICD-10-CM

## 2025-01-09 RX ORDER — ZOLPIDEM TARTRATE 10 MG/1
5 TABLET ORAL NIGHTLY PRN
Qty: 15 TABLET | Refills: 1 | Status: SHIPPED | OUTPATIENT
Start: 2025-01-09

## 2025-01-09 NOTE — TELEPHONE ENCOUNTER
Caller: Rafaela Cunha    Relationship to patient: Self    Best call back number: [phone]    Patient is needing:   ASKING FOR:   zolpidem (AMBIEN) 10 MG tablet     TO BE RESENT TO:   [facility] #[id number] [facility] [address] AT [facility] - [phone] [facility] [phone]  [phone]     PLEASE CALL TO CONFIRM ONCE SENT.

## 2025-01-13 ENCOUNTER — TREATMENT (OUTPATIENT)
Dept: PHYSICAL THERAPY | Facility: CLINIC | Age: 80
End: 2025-01-13
Payer: MEDICARE

## 2025-01-13 DIAGNOSIS — Z98.890 H/O KYPHOPLASTY: ICD-10-CM

## 2025-01-13 DIAGNOSIS — S22.009G CLOSED FRACTURE OF MULTIPLE THORACIC VERTEBRAE WITH DELAYED HEALING, SUBSEQUENT ENCOUNTER: Primary | ICD-10-CM

## 2025-01-13 DIAGNOSIS — Z78.9 DIFFICULTY WITH ACTIVITIES OF DAILY LIVING: ICD-10-CM

## 2025-01-13 PROCEDURE — 97140 MANUAL THERAPY 1/> REGIONS: CPT | Performed by: PHYSICAL THERAPIST

## 2025-01-13 PROCEDURE — 97535 SELF CARE MNGMENT TRAINING: CPT | Performed by: PHYSICAL THERAPIST

## 2025-01-13 PROCEDURE — 97035 APP MDLTY 1+ULTRASOUND EA 15: CPT | Performed by: PHYSICAL THERAPIST

## 2025-01-13 NOTE — PROGRESS NOTES
"Physical Therapy Daily Treatment Note    [facility]  [address] [place] [address]      Patient: Rafaela Cunha   : 1945  Referring practitioner: Hayes Oneill MD  Date of Initial Visit: Type: THERAPY  Noted: 2024  Today's Date: 2025  Patient seen for 2 sessions       Visit Diagnoses:    ICD-10-CM ICD-9-CM   1. Closed fracture of multiple thoracic vertebrae with delayed healing, subsequent encounter  S22.009G V54.17   2. H/O kyphoplasty  Z98.890 V45.89   3. Difficulty with activities of daily living  Z78.9 V49.89       Subjective Evaluation    History of Present Illness    Subjective comment: Doing well with home ex so far. shoulder and mid back popping during sh flexion but not too bad. Sore from side sleeping and open to trying pillow under her rib cage making a \"shoulder tunnel\"  Also consented to cupping today. Not on blood thinners.       Objective   See Exercise, Manual, and Modality Logs for complete treatment.       Assessment & Plan       Assessment  Assessment details: Pt did well today. Consented to cupping and will hold off on using her hot pack at home until redness resolves. Uses a hot pack daily for 3-4 hours so has a mottled redness to her skin. Cup redness should resolve in 24 hours.   Pt extremely thin with paraspinal tenderness and strain as it travels over her kyphotic spine.          Timed:         Manual Therapy:    8     mins  57344;     Therapeutic Exercise:     10    mins  72090;     Neuromuscular Ann:        mins  81055;    Therapeutic Activity:          mins  34252;     Gait Training:           mins  83748;     Ultrasound:     8     mins  86212;    Ionto                                   mins   79662  Self Care                       15     mins   60047  Canalith Repos         mins 07184  Work hardening   __   min 67327/49241      Un-Timed:  Electrical Stimulation:         mins  42259 ( );  Dry Needling          mins self-pay  Traction          " mins 54111      Timed Treatment:   41   mins   Total Treatment:     41   mins    Zorre Zeno Kimura, PT  [place] License: [id number]    [place] License:  [id number]

## 2025-01-17 ENCOUNTER — TREATMENT (OUTPATIENT)
Dept: PHYSICAL THERAPY | Facility: CLINIC | Age: 80
End: 2025-01-17
Payer: MEDICARE

## 2025-01-17 DIAGNOSIS — Z78.9 DIFFICULTY WITH ACTIVITIES OF DAILY LIVING: ICD-10-CM

## 2025-01-17 DIAGNOSIS — S22.009G CLOSED FRACTURE OF MULTIPLE THORACIC VERTEBRAE WITH DELAYED HEALING, SUBSEQUENT ENCOUNTER: Primary | ICD-10-CM

## 2025-01-17 DIAGNOSIS — Z98.890 H/O KYPHOPLASTY: ICD-10-CM

## 2025-01-17 NOTE — PROGRESS NOTES
Physical Therapy Daily Treatment Note    [facility]  [address] [place] [address]      Patient: Rafaela Cunha   : 1945  Referring practitioner: Hayes Oneill MD  Date of Initial Visit: Type: THERAPY  Noted: 2024  Today's Date: 2025  Patient seen for 3 sessions       Visit Diagnoses:    ICD-10-CM ICD-9-CM   1. Closed fracture of multiple thoracic vertebrae with delayed healing, subsequent encounter  S22.009G V54.17   2. H/O kyphoplasty  Z98.890 V45.89   3. Difficulty with activities of daily living  Z78.9 V49.89       Subjective Evaluation    History of Present Illness    Subjective comment: feeling better after rx and also had a brach block to test for effectiveness of an ablation. Now will have a second block before she can qualify for the ablation intervention. pt not sure if she's better from US, cupping, or branch block. one bruise left from large cup. Wants to do her heat pack thus wants to hold on cupping today due to bruise remaining.     Objective   See Exercise, Manual, and Modality Logs for complete treatment.       Assessment & Plan       Assessment  Assessment details: Pt doing well. Left with large circular ecchemosis from prior cupping but this area is also now much less tender to palpation.     Started on light resistive program in supine for shoulders. She can also start resuming her elbow and wrist curl program with 1-3# wts.  Pt will purchase 1, 1.5, 2, and 2.5# wts to supplement her 3# she currently has.     Avoiding tbands for now due to increase strain at end range. No long level lifting while upright due to counter strain at T-L spine.           Timed:         Manual Therapy:    8     mins  48827;     Therapeutic Exercise:    20     mins  95957;     Neuromuscular Ann:        mins  91192;    Therapeutic Activity:          mins  53923;     Gait Training:           mins  23777;     Ultrasound:     8     mins  24848;    Ionto                                    mins   08865  Self Care                            mins   68232  Canalith Repos         mins 34409  Work hardening   __   min 68110/38231      Un-Timed:  Electrical Stimulation:         mins  31443 ( );  Dry Needling          mins self-pay  Traction          mins 98911      Timed Treatment:   36   mins   Total Treatment:     36   mins    Zorre Zeno Kimura, PT  [place] License: [id number]    [place] License:  [id number]

## 2025-01-20 ENCOUNTER — TREATMENT (OUTPATIENT)
Dept: PHYSICAL THERAPY | Facility: CLINIC | Age: 80
End: 2025-01-20
Payer: MEDICARE

## 2025-01-20 DIAGNOSIS — Z78.9 DIFFICULTY WITH ACTIVITIES OF DAILY LIVING: ICD-10-CM

## 2025-01-20 DIAGNOSIS — S22.009G CLOSED FRACTURE OF MULTIPLE THORACIC VERTEBRAE WITH DELAYED HEALING, SUBSEQUENT ENCOUNTER: Primary | ICD-10-CM

## 2025-01-20 DIAGNOSIS — Z98.890 H/O KYPHOPLASTY: ICD-10-CM

## 2025-01-20 PROCEDURE — 97140 MANUAL THERAPY 1/> REGIONS: CPT | Performed by: PHYSICAL THERAPIST

## 2025-01-20 PROCEDURE — 97035 APP MDLTY 1+ULTRASOUND EA 15: CPT | Performed by: PHYSICAL THERAPIST

## 2025-01-20 PROCEDURE — 97110 THERAPEUTIC EXERCISES: CPT | Performed by: PHYSICAL THERAPIST

## 2025-01-20 NOTE — PROGRESS NOTES
Physical Therapy Daily Treatment Note    [facility]  [address] [place] [address]      Patient: Rafaela Cunha   : 1945  Referring practitioner: Hayes Oneill MD  Date of Initial Visit: Type: THERAPY  Noted: 2024  Today's Date: 2025  Patient seen for 4 sessions       Visit Diagnoses:    ICD-10-CM ICD-9-CM   1. Closed fracture of multiple thoracic vertebrae with delayed healing, subsequent encounter  S22.009G V54.17   2. H/O kyphoplasty  Z98.890 V45.89   3. Difficulty with activities of daily living  Z78.9 V49.89       Subjective Evaluation    History of Present Illness    Subjective comment: Did about 40 minutes of walking yesterday in the cold and maybe 30 would have been better as she pushed into pain too much and is sore today.     Objective   See Exercise, Manual, and Modality Logs for complete treatment.       Assessment & Plan       Assessment  Assessment details: Cont to have not strain at region where large silicone cup was used last week. However, above and below that area on the L side of her spine continues to have moderate strain.   Pt did well with supine sh ex and hamstring stretching. Needed cueing as she has not kept up with her routine as well as she could have.    P: walking 3 days a week then rest plus light stretching on off days.           Timed:         Manual Therapy:    12     mins  56299;     Therapeutic Exercise:    20     mins  32644;     Neuromuscular Ann:        mins  45764;    Therapeutic Activity:          mins  68904;     Gait Training:           mins  99594;     Ultrasound:     8     mins  27534;    Ionto                                   mins   85198  Self Care                            mins   85160  Canalith Repos         mins 46663  Work hardening   __   min 63076/29504      Un-Timed:  Electrical Stimulation:         mins  93933 ( );  Dry Needling          mins self-pay  Traction          mins 31732      Timed Treatment:   40   mins    Total Treatment:     40   mins    Zorre Zeno Kimura, PT  [place] License: [id number]    [place] License:  [id number]

## 2025-01-22 ENCOUNTER — TELEPHONE (OUTPATIENT)
Dept: PHYSICAL THERAPY | Facility: CLINIC | Age: 80
End: 2025-01-22

## 2025-01-22 NOTE — TELEPHONE ENCOUNTER
Caller: Rafaela Cunha    Relationship: Self       What was the call regarding: CAR ISSUES

## 2025-02-05 ENCOUNTER — TREATMENT (OUTPATIENT)
Dept: PHYSICAL THERAPY | Facility: CLINIC | Age: 80
End: 2025-02-05
Payer: MEDICARE

## 2025-02-05 DIAGNOSIS — Z98.890 H/O KYPHOPLASTY: ICD-10-CM

## 2025-02-05 DIAGNOSIS — S22.009G CLOSED FRACTURE OF MULTIPLE THORACIC VERTEBRAE WITH DELAYED HEALING, SUBSEQUENT ENCOUNTER: Primary | ICD-10-CM

## 2025-02-05 DIAGNOSIS — Z78.9 DIFFICULTY WITH ACTIVITIES OF DAILY LIVING: ICD-10-CM

## 2025-02-05 NOTE — PROGRESS NOTES
Physical Therapy Daily Treatment Note/30 day re-assess    [facility]  [address] [place] [address]      Patient: Rafaela Cunha   : 1945  Referring practitioner: Hayes Oneill MD  Date of Initial Visit: Type: THERAPY  Noted: 2024  Today's Date: 2025  Patient seen for 5 sessions       Visit Diagnoses:    ICD-10-CM ICD-9-CM   1. Closed fracture of multiple thoracic vertebrae with delayed healing, subsequent encounter  S22.009G V54.17   2. H/O kyphoplasty  Z98.890 V45.89   3. Difficulty with activities of daily living  Z78.9 V49.89       Subjective Evaluation    History of Present Illness    Subjective comment: Pt did well for 4 days after last session then had car trouble preventing her from attending PT. pt would rather use her heating pad then cupping due to the extended redness the cupping causes. Recently mopped her floor then wiped it down from her hands and knees. this took almost 2 hours so admits she is set back some. Will try to get some help to do this next time.       Objective   See Exercise, Manual, and Modality Logs for complete treatment.       Assessment & Plan       Assessment  Assessment details: Will have another branch block next week with success before she can have the RFA.   Focus on re-assess vs. Ex today.  Education regarding posture and possible benefit for posture shirt since she does not like to wear her back support much.    Pt doing okay after just 5 sessions and will continue with land based PT and modalities, but will encourage at least trying a session of aquatic therapy. Right now apprehensive about the cold weather after getting out of a warm pool.       Goals  Plan Goals: STGs 4 weeks:  1. Pt to be indep in variety of land based ex for gym and home setting. Partially MET. Evolving after just 5 visits.   2. Pt to be indep in variety of aquatic ex for ongoing indep ex. NOT MET. No plans to start to aquatic therapy at this time.   3. Pt to state she  can walk 20-30 minutes with no increase in back pain. NOT MET. Still pain at rest and worsens with more time on her feet.   4. JOSÉ score to improve from 62 to < 54%. NOT MET. Now 64%   LTGs 12 weeks:  1. Pt to be able to walk 40 or more minutes with no increase in pain 24 hours post ex. NOT MET  2. L shoulder ROM to improve from 140 to 155 degrees of flexion. Partially met 147.   3. UE strength to improve from 4- to 4+ lifting light weights and decreasing chance of spinal comp fx. NOT MET. 1# wt at now with goal for 2-3#.   4. JOSÉ score < 42%    Timed:         Manual Therapy:    12     mins  07106;     Therapeutic Exercise:         mins  55440;     Neuromuscular Ann:        mins  54229;    Therapeutic Activity:          mins  99799;     Gait Training:           mins  48789;     Ultrasound:      8    mins  53423;    Ionto                                   mins   91523  Self Care                       10     mins   00883  Canalith Repos         mins 41116  Work hardening   __   min 90615/22330      Un-Timed:  Electrical Stimulation:         mins  18245 ( );  Dry Needling          mins self-pay  Traction          mins 27502      Timed Treatment:   30   mins   Total Treatment:     30   mins    Zorre Zeno Kimura, PT  [place] License: [id number]    [place] License:  [id number]

## 2025-02-07 ENCOUNTER — TREATMENT (OUTPATIENT)
Dept: PHYSICAL THERAPY | Facility: CLINIC | Age: 80
End: 2025-02-07
Payer: MEDICARE

## 2025-02-07 DIAGNOSIS — Z78.9 DIFFICULTY WITH ACTIVITIES OF DAILY LIVING: ICD-10-CM

## 2025-02-07 DIAGNOSIS — Z98.890 H/O KYPHOPLASTY: ICD-10-CM

## 2025-02-07 DIAGNOSIS — S22.009G CLOSED FRACTURE OF MULTIPLE THORACIC VERTEBRAE WITH DELAYED HEALING, SUBSEQUENT ENCOUNTER: Primary | ICD-10-CM

## 2025-02-07 NOTE — PROGRESS NOTES
Physical Therapy Daily Treatment Note    [facility]      Patient: Rafaela Cunha   : 1945  Referring practitioner: Hayes Oneill MD  Date of Initial Visit: Type: THERAPY  Noted: 2024  Today's Date: 2025  Patient seen for 6 sessions       Visit Diagnoses:    ICD-10-CM ICD-9-CM   1. Closed fracture of multiple thoracic vertebrae with delayed healing, subsequent encounter  S22.009G V54.17   2. H/O kyphoplasty  Z98.890 V45.89   3. Difficulty with activities of daily living  Z78.9 V49.89       Subjective Evaluation    History of Present Illness    Subjective comment: Better today so willing to start with some exercises. Can do the nu step but with legs only due to twisting feeling in her back if arms added.     Objective   See Exercise, Manual, and Modality Logs for complete treatment.       Assessment & Plan       Assessment  Assessment details: Pt did well today adding tricep push down to program. No issue with nu step legs but not arms. Pt to keep up with 1# PREs at home and maybe 1.5 but not ready for 2# just yet.     P: see how KT tape helps. Instructed to take off if irritated today but otherwise leave on for 3-4 days. Pt stated it felt good today.           Timed:         Manual Therapy:    10    mins  35805;     Therapeutic Exercise:    10     mins  10243;     Neuromuscular Ann:        mins  53370;    Therapeutic Activity:          mins  15478;     Gait Training:           mins  58195;     Ultrasound:     8     mins  92660;    Ionto                                   mins   15542  Self Care                            mins   99724  Canalith Repos         mins 74219  Work hardening   __   min 21930/58147      Un-Timed:  Electrical Stimulation:         mins  71279 ( );  Dry Needling          mins self-pay  Traction          mins 80859      Timed Treatment:   28   mins   Total Treatment:     28   mins    Zorre Zeno Kimura, PT  [place] License: [id number]    [place] License:  [id number]

## 2025-02-10 ENCOUNTER — TREATMENT (OUTPATIENT)
Dept: PHYSICAL THERAPY | Facility: CLINIC | Age: 80
End: 2025-02-10
Payer: MEDICARE

## 2025-02-10 DIAGNOSIS — Z98.890 H/O KYPHOPLASTY: ICD-10-CM

## 2025-02-10 DIAGNOSIS — S22.009G CLOSED FRACTURE OF MULTIPLE THORACIC VERTEBRAE WITH DELAYED HEALING, SUBSEQUENT ENCOUNTER: Primary | ICD-10-CM

## 2025-02-10 DIAGNOSIS — Z78.9 DIFFICULTY WITH ACTIVITIES OF DAILY LIVING: ICD-10-CM

## 2025-02-10 PROCEDURE — 97035 APP MDLTY 1+ULTRASOUND EA 15: CPT | Performed by: PHYSICAL THERAPIST

## 2025-02-10 PROCEDURE — 97110 THERAPEUTIC EXERCISES: CPT | Performed by: PHYSICAL THERAPIST

## 2025-02-10 PROCEDURE — 97140 MANUAL THERAPY 1/> REGIONS: CPT | Performed by: PHYSICAL THERAPIST

## 2025-02-10 NOTE — PROGRESS NOTES
Physical Therapy Daily Treatment Note    [facility]  [address] [place] [address]      Patient: Rafaela Cunha   : 1945  Referring practitioner: Hayes Oneill MD  Date of Initial Visit: Type: THERAPY  Noted: 2024  Today's Date: 2/10/2025  Patient seen for 7 sessions       Visit Diagnoses:    ICD-10-CM ICD-9-CM   1. Closed fracture of multiple thoracic vertebrae with delayed healing, subsequent encounter  S22.009G V54.17   2. H/O kyphoplasty  Z98.890 V45.89   3. Difficulty with activities of daily living  Z78.9 V49.89       Subjective Evaluation    History of Present Illness    Subjective comment: Not much benefit from KT tape. Thinks she gets more support from her posture brace but still apprehensive about wearing it. Encouraged to get started with 1-2 hours a day.       Objective   See Exercise, Manual, and Modality Logs for complete treatment.       Assessment & Plan       Assessment  Assessment details: Pt doing well with light ex.   Less strain along L rhomboid area felt.     P: cont to encourage use of her posture support a few hours a day. Cont with careful ex due to compression deformities of 40% at T7, 70% at T8, 10% at T9, 50% at T10, and 20% at T11. This found on MRI 10/2024.           Timed:         Manual Therapy:    10     mins  27898;     Therapeutic Exercise:    20     mins  74810;     Neuromuscular Ann:        mins  44369;    Therapeutic Activity:          mins  77261;     Gait Training:           mins  74402;     Ultrasound:     8     mins  51372;    Ionto                                   mins   89122  Self Care                            mins   21163  Canalith Repos         mins 04994  Work hardening   __   min 11017/31975      Un-Timed:  Electrical Stimulation:         mins  17044 ( );  Dry Needling          mins self-pay  Traction          mins 80449      Timed Treatment:   38   mins   Total Treatment:     38   mins    Zorre Zeno Kimura, PT  [place] License:  [id number]    [place] License:  [id number]

## 2025-02-14 ENCOUNTER — TREATMENT (OUTPATIENT)
Dept: PHYSICAL THERAPY | Facility: CLINIC | Age: 80
End: 2025-02-14
Payer: MEDICARE

## 2025-02-14 DIAGNOSIS — Z78.9 DIFFICULTY WITH ACTIVITIES OF DAILY LIVING: ICD-10-CM

## 2025-02-14 DIAGNOSIS — S22.009G CLOSED FRACTURE OF MULTIPLE THORACIC VERTEBRAE WITH DELAYED HEALING, SUBSEQUENT ENCOUNTER: Primary | ICD-10-CM

## 2025-02-14 DIAGNOSIS — Z98.890 H/O KYPHOPLASTY: ICD-10-CM

## 2025-02-14 NOTE — PROGRESS NOTES
Physical Therapy Daily Treatment Note    [facility]  [address] [place] [address]      Patient: Rafaela Cunha   : 1945  Referring practitioner: Hayes Oneill MD  Date of Initial Visit: Type: THERAPY  Noted: 2024  Today's Date: 2025  Patient seen for 8 sessions       Visit Diagnoses:    ICD-10-CM ICD-9-CM   1. Closed fracture of multiple thoracic vertebrae with delayed healing, subsequent encounter  S22.009G V54.17   2. H/O kyphoplasty  Z98.890 V45.89   3. Difficulty with activities of daily living  Z78.9 V49.89       Subjective Evaluation    History of Present Illness    Subjective comment: Did well after the branch block. Hoping to now get the go ahead for the RFA but did not get a call from the pain clinic yet.     Objective   See Exercise, Manual, and Modality Logs for complete treatment.   Access Code: [email or web address]  URL: [email or web address]  Date: 2025  Prepared by: Zorre Kimura    Exercises  - Supine Shoulder Flexion Extension Full Range AROM  - 1 x daily - 7 x weekly - 2 sets - 10 reps  - Supine Chest Press with Dumbbells  - 1 x daily - 7 x weekly - 2 sets - 10 reps  - Reclined Elbow Flexion with Anchored Resistance  - 1 x daily - 7 x weekly - 2 sets - 10 reps - 1-2# wt hold  - Seated Shoulder Row with Anchored Resistance  - 1 x daily - 7 x weekly - 2 sets - 10 reps - no weights or bands hold  - Supine Shoulder Circles with Weight  - 1 x daily - 7 x weekly - 2 sets - 10 reps - 1# hold  - Single Leg Stance  - 1 x daily - 7 x weekly - 5 reps - 10seconds hold  - Runner's Step Up/Down  - 1 x daily - 7 x weekly - 2 sets - 10 reps  - Standing March with Counter Support  - 1 x daily - 7 x weekly - 2 sets - 10 reps  - Standing Romberg to 1/2 Tandem Stance  - 1 x daily - 7 x weekly - 2 sets - on pads. head turns or eyes closed. 30 sec hold    Assessment & Plan       Assessment  Assessment details: Pt doing better overall. Gradually working on strength and given more  HEP to take home.   Less strain along L paraspinal.   Focus on light wts and NO bands to avoid strain.   2 more sessions to go to wrap up this episode of care.     Pt scored 55/120 48% on Chronic Pain Acceptance Q. Moderate score very appropriate with level of pain. Acute pain post fx was 8-10. Now in the 5-7 most days but still has 8-9 moments. Hoping the RFA changes this.           Timed:         Manual Therapy:    10     mins  81405;     Therapeutic Exercise:     25    mins  33228;     Neuromuscular Ann:        mins  19469;    Therapeutic Activity:          mins  28241;     Gait Training:           mins  79413;     Ultrasound:    8      mins  74923;    Ionto                                   mins   20342  Self Care                            mins   51828  Canalith Repos         mins 97893  Work hardening   __   min 33614/26500      Un-Timed:  Electrical Stimulation:         mins  31980 ( );  Dry Needling          mins self-pay  Traction          mins 64164      Timed Treatment:   43   mins   Total Treatment:     43   mins    Zorre Zeno Kimura, PT  [place] License: [id number]    [place] License:  [id number]

## 2025-02-19 ENCOUNTER — LAB (OUTPATIENT)
Facility: HOSPITAL | Age: 80
End: 2025-02-19
Payer: MEDICARE

## 2025-02-19 PROCEDURE — 80053 COMPREHEN METABOLIC PANEL: CPT | Performed by: STUDENT IN AN ORGANIZED HEALTH CARE EDUCATION/TRAINING PROGRAM

## 2025-02-21 ENCOUNTER — TREATMENT (OUTPATIENT)
Dept: PHYSICAL THERAPY | Facility: CLINIC | Age: 80
End: 2025-02-21
Payer: MEDICARE

## 2025-02-21 DIAGNOSIS — S22.009G CLOSED FRACTURE OF MULTIPLE THORACIC VERTEBRAE WITH DELAYED HEALING, SUBSEQUENT ENCOUNTER: Primary | ICD-10-CM

## 2025-02-21 DIAGNOSIS — Z78.9 DIFFICULTY WITH ACTIVITIES OF DAILY LIVING: ICD-10-CM

## 2025-02-21 DIAGNOSIS — Z98.890 H/O KYPHOPLASTY: ICD-10-CM

## 2025-02-21 NOTE — PROGRESS NOTES
Physical Therapy Daily Treatment Note    [facility]  [address] [place] [address]      Patient: Rafaela Cunha   : 1945  Referring practitioner: Hayes Oneill MD  Date of Initial Visit: Type: THERAPY  Noted: 2024  Today's Date: 2025  Patient seen for 9 sessions       Visit Diagnoses:    ICD-10-CM ICD-9-CM   1. Closed fracture of multiple thoracic vertebrae with delayed healing, subsequent encounter  S22.009G V54.17   2. H/O kyphoplasty  Z98.890 V45.89   3. Difficulty with activities of daily living  Z78.9 V49.89       Subjective Evaluation    History of Present Illness    Subjective comment: little sore from unpacking boxes this week and remodelers in her home causing some stress. trying not to do any significant lifting or bending for more than 1-2 hours a day       Objective   See Exercise, Manual, and Modality Logs for complete treatment.       Assessment & Plan       Assessment  Assessment details: Strain along L paraspinal continues to improve.   Doing well with specific ex routine but needs to work more on L shoulder flexion in supine at home.     P: 2 more sessions planned for this episode of care then dc to indep program.           Timed:         Manual Therapy:    10     mins  54373;     Therapeutic Exercise:    20     mins  59196;     Neuromuscular Ann:        mins  38794;    Therapeutic Activity:          mins  95319;     Gait Training:           mins  68188;     Ultrasound:     8     mins  82795;    Ionto                                   mins   80466  Self Care                            mins   10866  Canalith Repos         mins 51041  Work hardening   __   min 58692/79427      Un-Timed:  Electrical Stimulation:         mins  52522 ( );  Dry Needling          mins self-pay  Traction          mins 66214      Timed Treatment:   38  mins   Total Treatment:     38  mins    Zorre Zeno Kimura, PT  [place] License: [id number]    [place] License:  [id number]

## 2025-02-26 ENCOUNTER — OFFICE VISIT (OUTPATIENT)
Dept: INTERNAL MEDICINE | Facility: CLINIC | Age: 80
End: 2025-02-26
Payer: MEDICARE

## 2025-02-26 ENCOUNTER — TRANSCRIBE ORDERS (OUTPATIENT)
Dept: ADMINISTRATIVE | Facility: HOSPITAL | Age: 80
End: 2025-02-26
Payer: MEDICARE

## 2025-02-26 VITALS
DIASTOLIC BLOOD PRESSURE: 72 MMHG | WEIGHT: 95.2 LBS | HEART RATE: 79 BPM | BODY MASS INDEX: 19.98 KG/M2 | OXYGEN SATURATION: 97 % | HEIGHT: 58 IN | SYSTOLIC BLOOD PRESSURE: 116 MMHG

## 2025-02-26 DIAGNOSIS — M81.8 OTHER OSTEOPOROSIS WITHOUT CURRENT PATHOLOGICAL FRACTURE: ICD-10-CM

## 2025-02-26 DIAGNOSIS — B00.9 HERPES SIMPLEX: Primary | ICD-10-CM

## 2025-02-26 DIAGNOSIS — K21.9 GASTROESOPHAGEAL REFLUX DISEASE, UNSPECIFIED WHETHER ESOPHAGITIS PRESENT: ICD-10-CM

## 2025-02-26 DIAGNOSIS — M80.08XA AGE-RELATED OSTEOPOROSIS WITH CURRENT PATHOLOGICAL FRACTURE, VERTEBRA(E), INITIAL ENCOUNTER FOR FRACTURE: ICD-10-CM

## 2025-02-26 DIAGNOSIS — M54.50 LUMBAR PAIN: Primary | ICD-10-CM

## 2025-02-26 RX ORDER — VALACYCLOVIR HYDROCHLORIDE 500 MG/1
500 TABLET, FILM COATED ORAL 2 TIMES DAILY
Qty: 30 TABLET | Refills: 0 | Status: SHIPPED | OUTPATIENT
Start: 2025-02-26

## 2025-02-26 RX ORDER — LOTEPREDNOL ETABONATE 5 MG/ML
SUSPENSION/ DROPS OPHTHALMIC
COMMUNITY
Start: 2025-02-18

## 2025-02-26 NOTE — PROGRESS NOTES
"Chief Complaint  Mouth Lesions and Osteoporosis    Subjective        Rafaela Cunha presents to [facility]  History of Present Illness    Presents to clinic today for follow up    She is doing well since last visit, reports feeling better as she came down with significant generalized weakness and fatigue    She saw [facility] yesterday and reports she liked her provider and recommended repeating MRI in 3 months, that is scheduled for end of March. She also is planning to have RFA shortly before this        Objective   Vital Signs:  /72   Pulse 79   Ht 147.3 cm (57.98\")   Wt 43.2 kg (95 lb 3.2 oz)   SpO2 97%   BMI 19.91 kg/m²   Estimated body mass index is 19.91 kg/m² as calculated from the following:    Height as of this encounter: 147.3 cm (57.98\").    Weight as of this encounter: 43.2 kg (95 lb 3.2 oz).    BMI is within normal parameters. No other follow-up for BMI required.      Physical Exam  Vitals reviewed.   Constitutional:       General: She is not in acute distress.     Appearance: Normal appearance. She is not toxic-appearing.   HENT:      Head: Normocephalic and atraumatic.   Eyes:      General: No scleral icterus.     Conjunctiva/sclera: Conjunctivae normal.   Skin:     General: Skin is warm and dry.   Neurological:      Mental Status: She is alert and oriented to person, place, and time.   Psychiatric:         Mood and Affect: Mood normal.         Behavior: Behavior normal.        Result Review :                Assessment and Plan   Diagnoses and all orders for this visit:    1. Herpes simplex (Primary)  -     valACYclovir (Valtrex) 500 MG tablet; Take 1 tablet by mouth 2 (Two) Times a Day.  Dispense: 30 tablet; Refill: 0    2. Age-related osteoporosis with current pathological fracture, vertebra(e), initial encounter for fracture      Will refill valtrex to be used for HSV cutaneous infection     She is now seeing [facility] and has upcoming MRI scheduled. She " was referred to endocrinology which I think is great idea to discuss pharmacotherapy as she is intolerant of multiple medications (e.g. bisphosphonates). She stated if she doesn't hear anything from endocrinology in 2 weeks she would like to establish with [facility] and I can place referral    Will plan to obtain labs for osteoporosis in case endocrinology does not see her prior to next appt    RTC in 4mo for AWV or sooenr prn, labs prior         Follow Up   Return in about 4 months (around 6/26/2025) for Medicare Wellness.  Patient was given instructions and counseling regarding her condition or for health maintenance advice. Please see specific information pulled into the AVS if appropriate.

## 2025-02-28 ENCOUNTER — TREATMENT (OUTPATIENT)
Dept: PHYSICAL THERAPY | Facility: CLINIC | Age: 80
End: 2025-02-28
Payer: MEDICARE

## 2025-02-28 DIAGNOSIS — Z98.890 H/O KYPHOPLASTY: ICD-10-CM

## 2025-02-28 DIAGNOSIS — Z78.9 DIFFICULTY WITH ACTIVITIES OF DAILY LIVING: ICD-10-CM

## 2025-02-28 DIAGNOSIS — S22.009G CLOSED FRACTURE OF MULTIPLE THORACIC VERTEBRAE WITH DELAYED HEALING, SUBSEQUENT ENCOUNTER: Primary | ICD-10-CM

## 2025-02-28 NOTE — PROGRESS NOTES
Physical Therapy Daily Treatment Note    [facility]  [address] [place] [address]      Patient: Rafaela Cunha   : 1945  Referring practitioner: Hayes Oneill MD  Date of Initial Visit: Type: THERAPY  Noted: 2024  Today's Date: 2025  Patient seen for 10 sessions       Visit Diagnoses:    ICD-10-CM ICD-9-CM   1. Closed fracture of multiple thoracic vertebrae with delayed healing, subsequent encounter  S22.009G V54.17   2. H/O kyphoplasty  Z98.890 V45.89   3. Difficulty with activities of daily living  Z78.9 V49.89       Subjective Evaluation    History of Present Illness    Subjective comment: mid back pain continues and feeling more general pain likely due to cold weather front coming in. Brought her TENS unit in for me to show her how to adjust channels.       Objective   See Exercise, Manual, and Modality Logs for complete treatment.       Assessment/Plan      Timed:         Manual Therapy:    15     mins  44302;     Therapeutic Exercise:    15     mins  05454;     Neuromuscular Ann:        mins  46543;    Therapeutic Activity:          mins  10054;     Gait Training:           mins  41472;     Ultrasound:     8     mins  28555;    Ionto                                   mins   95661  Self Care                       15     mins   46122  Canalith Repos         mins 49870  Work hardening   __   min 37222/95343      Un-Timed:  Electrical Stimulation:         mins  45542 ( );  Dry Needling          mins self-pay  Traction          mins 43299      Timed Treatment:   53   mins   Total Treatment:    53    mins    Zorre Zeno Kimura, PT  [place] License: [id number]    [place] License:  [id number]

## 2025-03-14 ENCOUNTER — TREATMENT (OUTPATIENT)
Dept: PHYSICAL THERAPY | Facility: CLINIC | Age: 80
End: 2025-03-14
Payer: MEDICARE

## 2025-03-14 DIAGNOSIS — Z78.9 DIFFICULTY WITH ACTIVITIES OF DAILY LIVING: ICD-10-CM

## 2025-03-14 DIAGNOSIS — S22.009G CLOSED FRACTURE OF MULTIPLE THORACIC VERTEBRAE WITH DELAYED HEALING, SUBSEQUENT ENCOUNTER: Primary | ICD-10-CM

## 2025-03-14 DIAGNOSIS — Z98.890 H/O KYPHOPLASTY: ICD-10-CM

## 2025-03-14 NOTE — PROGRESS NOTES
"Physical Therapy Daily Treatment Note/30 day re-assess    [facility]  [address] [place] [address]      Patient: Rafaela Cunha   : 1945  Referring practitioner: Hayes Oneill MD  Date of Initial Visit: Type: THERAPY  Noted: 2024  Today's Date: 3/14/2025  Patient seen for 11 sessions       Visit Diagnoses:    ICD-10-CM ICD-9-CM   1. Closed fracture of multiple thoracic vertebrae with delayed healing, subsequent encounter  S22.009G V54.17   2. H/O kyphoplasty  Z98.890 V45.89   3. Difficulty with activities of daily living  Z78.9 V49.89       Subjective Evaluation    History of Present Illness    Subjective comment: will have her first RFA on 3-19-25 and might have more pending results. brought pictures of her [employer] gym in for me to review and approve. Can do recumbent bike and TM while other machines not good for her t spine fx.       Objective   See Exercise, Manual, and Modality Logs for complete treatment.       Assessment & Plan       Assessment  Assessment details: Pt states she is doing much better as she takes a Tramadol around 4 am when she goes to the bathroom. Taking one before bed and saving the other for the middle of the night and this has helped her morning pain. Also feels that the warmer weather has helped her pain.   Hasn't been able to do much of her ex as she has been busy getting her storage unit cleaned out. Didn't do any lifting delegating most of the work.   Pt will get her RFA next week and will see in about 3 weeks. Pt will start working out in her condo gym and walking more outside.    P: see in 3 weeks a likely dc at that time \"saving\" more PT for later in the year. Pt seen for 11x and doing well to reach most of short term goals so far and has a number of HEP to keep her busy.       oals  Plan Goals: STGs 4 weeks:  1. Pt to be indep in variety of land based ex for gym and home setting. MET  2. Pt to be indep in variety of aquatic ex for ongoing indep ex. NOT " MET. No plans to start to aquatic therapy at this time.   3. Pt to state she can walk 20-30 minutes with no increase in back pain. Partially MET. Endurance is getting better and has gone an hour but pain levels increase to 7/10. Resting with heat allows pain to drop back to 2/10.   4. JOSÉ score to improve from 62 to < 54%. Partially met. Now 56%  LTGs 12 weeks:  1. Pt to be able to walk 40 or more minutes with no increase in pain 24 hours post ex. NOT MET  2. L shoulder ROM to improve from 140 to 155 degrees of flexion. Partially met 150  3. UE strength to improve from 4- to 4+ lifting light weights and decreasing chance of spinal comp fx. NOT MET. 1# wt for now.   4. JOSÉ score < 42%. NOT  MET    Timed:         Manual Therapy:    10     mins  37299;     Therapeutic Exercise:         mins  03967;     Neuromuscular Ann:        mins  25274;    Therapeutic Activity:          mins  85276;     Gait Training:           mins  31006;     Ultrasound:     8     mins  36048;    Ionto                                   mins   59704  Self Care                       15     mins   48795  Canalith Repos         mins 48796  Work hardening   __   min 76980/06437      Un-Timed:  Electrical Stimulation:         mins  98705 ( );  Dry Needling          mins self-pay  Traction          mins 49968      Timed Treatment:    33  mins   Total Treatment:    33    mins    Zorre Zeno Kimura, PT  [place] License: [id number]    [place] License:  [id number]

## 2025-03-25 ENCOUNTER — HOSPITAL ENCOUNTER (OUTPATIENT)
Facility: HOSPITAL | Age: 80
Discharge: HOME | End: 2025-03-25
Admitting: NURSE PRACTITIONER
Payer: MEDICARE

## 2025-03-25 DIAGNOSIS — M54.50 LUMBAR PAIN: ICD-10-CM

## 2025-03-25 PROCEDURE — 72146 MRI CHEST SPINE W/O DYE: CPT

## 2025-03-26 ENCOUNTER — HOSPITAL ENCOUNTER (OUTPATIENT)
Facility: HOSPITAL | Age: 80
Discharge: HOME | End: 2025-03-26
Admitting: STUDENT IN AN ORGANIZED HEALTH CARE EDUCATION/TRAINING PROGRAM
Payer: MEDICARE

## 2025-03-26 ENCOUNTER — OFFICE VISIT (OUTPATIENT)
Dept: INTERNAL MEDICINE | Facility: CLINIC | Age: 80
End: 2025-03-26
Payer: MEDICARE

## 2025-03-26 ENCOUNTER — TELEPHONE (OUTPATIENT)
Dept: INTERNAL MEDICINE | Facility: CLINIC | Age: 80
End: 2025-03-26
Payer: MEDICARE

## 2025-03-26 VITALS
HEART RATE: 74 BPM | OXYGEN SATURATION: 98 % | HEIGHT: 58 IN | DIASTOLIC BLOOD PRESSURE: 60 MMHG | BODY MASS INDEX: 19.9 KG/M2 | SYSTOLIC BLOOD PRESSURE: 124 MMHG | WEIGHT: 94.8 LBS

## 2025-03-26 DIAGNOSIS — G89.4 CHRONIC PAIN SYNDROME: ICD-10-CM

## 2025-03-26 DIAGNOSIS — R07.81 RIB PAIN: Primary | ICD-10-CM

## 2025-03-26 PROCEDURE — 71101 X-RAY EXAM UNILAT RIBS/CHEST: CPT

## 2025-03-26 RX ORDER — GABAPENTIN 100 MG/1
100 CAPSULE ORAL 3 TIMES DAILY
Qty: 30 CAPSULE | Refills: 0 | Status: SHIPPED | OUTPATIENT
Start: 2025-03-26

## 2025-03-26 RX ORDER — LIDOCAINE 50 MG/G
1 PATCH TOPICAL EVERY 24 HOURS
Qty: 30 EACH | Refills: 2 | Status: SHIPPED | OUTPATIENT
Start: 2025-03-26

## 2025-03-26 NOTE — TELEPHONE ENCOUNTER
Patient is wanting to make an appointment in the afternoon ASAP, patient had an ablation done a week ago and has been experiencing pain ever since, it is tender to touch on the right side and wraps around her back, patient has had 10 fractured ribs before and believes she may have another one, however she knows something is not right, she has tried calling Dr. Oneill's office with pain management but they have not returned her call, please call [phone].

## 2025-03-27 NOTE — PROGRESS NOTES
"Chief Complaint  No chief complaint on file.    Subjective        Rafaela Cunha presents to [facility]  Pain      Presents to clinic today with complaints of right rib pain    She states she has had about 1 week of severe rib pain on her right side, and reports concern for fracture as she has had this in the past and feels similar. Denies any falls or trauma but does state she went to a dog show recently and walked on concrete for much longer than normal and seemed to come on after that. She also had recent nerve ablation on her back and was told symptoms may worsen for about a month after she starts to feel better.      Objective   Vital Signs:  /60   Pulse 74   Ht 147.3 cm (57.98\")   Wt 43 kg (94 lb 12.8 oz)   SpO2 98%   BMI 19.83 kg/m²   Estimated body mass index is 19.83 kg/m² as calculated from the following:    Height as of this encounter: 147.3 cm (57.98\").    Weight as of this encounter: 43 kg (94 lb 12.8 oz).    BMI is within normal parameters. No other follow-up for BMI required.      Physical Exam  Vitals reviewed.   Constitutional:       General: She is not in acute distress.     Appearance: Normal appearance. She is underweight. She is not toxic-appearing.   HENT:      Head: Normocephalic and atraumatic.   Eyes:      General: No scleral icterus.     Conjunctiva/sclera: Conjunctivae normal.   Chest:      Chest wall: Tenderness present. No deformity or swelling.      Comments: Significant tenderness to palpation and touch of right lower rib cage at anterior axillary line, somewhat out of proportion to exam findings   Skin:     General: Skin is warm and dry.   Neurological:      Mental Status: She is alert and oriented to person, place, and time.   Psychiatric:         Mood and Affect: Mood normal.         Behavior: Behavior normal.        Result Review :                Assessment and Plan   Diagnoses and all orders for this visit:    1. Rib pain (Primary)  -     " gabapentin (NEURONTIN) 100 MG capsule; Take 1 capsule by mouth 3 (Three) Times a Day.  Dispense: 30 capsule; Refill: 0  -     lidocaine (LIDODERM) 5 %; Place 1 patch on the skin as directed by provider Daily.  Dispense: 30 each; Refill: 2    2. Chronic pain syndrome  -     gabapentin (NEURONTIN) 100 MG capsule; Take 1 capsule by mouth 3 (Three) Times a Day.  Dispense: 30 capsule; Refill: 0      Reviewed and interpreted xray of rib obtained prior to this appointment, do not appreciate any signs of fracture or bony abnormalities. Unfortunately I suspect that given degree of her fraility and known severe osteoporosis it is likely from overuse from recent excess walking as well as possible component of recent nerve block on thoracic spine by pain management done recently    Exam notable for nearly allodynia and could be neuropathic in nature so she may benefit from gabapentin and we will try this as well as lidocaine patches    RTC as previously scheduled or if sx worsen         Follow Up   No follow-ups on file.  Patient was given instructions and counseling regarding her condition or for health maintenance advice. Please see specific information pulled into the AVS if appropriate.

## 2025-04-02 RX ORDER — SUMATRIPTAN SUCCINATE 100 MG/1
TABLET ORAL
Qty: 90 TABLET | Refills: 0 | Status: SHIPPED | OUTPATIENT
Start: 2025-04-02

## 2025-04-11 ENCOUNTER — TREATMENT (OUTPATIENT)
Dept: PHYSICAL THERAPY | Facility: CLINIC | Age: 80
End: 2025-04-11
Payer: MEDICARE

## 2025-04-11 DIAGNOSIS — S22.009G CLOSED FRACTURE OF MULTIPLE THORACIC VERTEBRAE WITH DELAYED HEALING, SUBSEQUENT ENCOUNTER: Primary | ICD-10-CM

## 2025-04-11 DIAGNOSIS — Z98.890 H/O KYPHOPLASTY: ICD-10-CM

## 2025-04-11 DIAGNOSIS — Z78.9 DIFFICULTY WITH ACTIVITIES OF DAILY LIVING: ICD-10-CM

## 2025-04-11 NOTE — PROGRESS NOTES
Physical Therapy Daily Treatment Note/discharge summary    [facility]  [address] [place] [address]      Patient: Rafaela Cunha   : 1945  Referring practitioner: Hayes Oneill MD  Date of Initial Visit: Type: THERAPY  Noted: 2024  Today's Date: 2025  Patient seen for 12 sessions       Visit Diagnoses:    ICD-10-CM ICD-9-CM   1. Closed fracture of multiple thoracic vertebrae with delayed healing, subsequent encounter  S22.009G V54.17   2. H/O kyphoplasty  Z98.890 V45.89   3. Difficulty with activities of daily living  Z78.9 V49.89       Subjective Evaluation    History of Present Illness    Subjective comment: pt reporting increased R flank and rib pain after ablation in her mid T spine 3 weeks ago. Felt like a prior rib fx so she consulted with her PCP and fx was r/o via xray. Referred to [facility] where she was very happy with her eval and plan for TLSO. She will get this via  asap and not pursue another ablation. Feels like she can dc PT for now and see us again in the fall if needed. pt has not done any ex in the past 3 weeks and will gradually return to the home program given       Objective   See Exercise, Manual, and Modality Logs for complete treatment.       Assessment & Plan       Assessment  Assessment details: Pt with R sided rhomboid strain and spasm correlating with post ablation symptoms.   Pt dc'd this day after 12 sessions since eval on 24.   Pt indep with home ex and also learned quite of bit of ex in the gym that she can do at her condo prn.   Right now still in acute pain and fearful of much movement so will try to get back to baseline before starting her ex program back up again.   Pt planning to rest, get her back brace, and follow with the spine clinic for further direction. No plans to continue with pain clinic at this time.       Plan Goals: STGs 4 weeks:  1. Pt to be indep in variety of land based ex for gym and home setting.  MET  2. Pt to be indep in variety of aquatic ex for ongoing indep ex. NOT MET. No plans to start to aquatic therapy at this time.   3. Pt to state she can walk 20-30 minutes with no increase in back pain. NOT MET. Previously had worked up to this with mild to moderate pain but now back with high pain levels and no activity.   4. JOSÉ score to improve from 62 to < 54%. NOT MET. Had improved to 56% but now back up to 68%.   LTGs 12 weeks:  1. Pt to be able to walk 40 or more minutes with no increase in pain 24 hours post ex. NOT MET  2. L shoulder ROM to improve from 140 to 155 degrees of flexion. MET. 165.   3. UE strength to improve from 4- to 4+ lifting light weights and decreasing chance of spinal comp fx. NOT MET. 1# wt for now. And actually has not done any lifting in the past 3 weeks since the ablation  4. JOSÉ score < 42%. NOT  MET       Timed:         Manual Therapy:    12     mins  67179;     Therapeutic Exercise:         mins  53162;     Neuromuscular Ann:        mins  56620;    Therapeutic Activity:          mins  88041;     Gait Training:           mins  12531;     Ultrasound:     8     mins  85173;    Ionto                                   mins   35278  Self Care                       15     mins   40413  Canalith Repos         mins 60086  Work hardening   __   min 37410/86356      Un-Timed:  Electrical Stimulation:         mins  24119 ( );  Dry Needling          mins self-pay  Traction          mins 04736      Timed Treatment:   35   mins   Total Treatment:     35   mins    Zorre Zeno Kimura, PT  [place] License: [id number]    [place] License:  [id number]

## 2025-04-11 NOTE — PROGRESS NOTES
Physical Therapy Re Certification Of Plan of Care  Patient: Rafaela Cunha   : 1945  Diagnosis/ICD-10 Code:  Closed fracture of multiple thoracic vertebrae with delayed healing, subsequent encounter [S22.009G]  Referring practitioner: Hayes Oneill MD  Date of Initial Visit: 2025  Today's Date: 2025  Patient seen for 12 sessions         Visit Diagnoses:    ICD-10-CM ICD-9-CM   1. Closed fracture of multiple thoracic vertebrae with delayed healing, subsequent encounter  S22.009G V54.17   2. H/O kyphoplasty  Z98.890 V45.89   3. Difficulty with activities of daily living  Z78.9 V49.89         Rafaela Cunha reports: ***  Subjective Questionnaire: {PT subjective questionnaires:87280}  Clinical Progress: {UNCHANGED, IMPROVED, WORSE:71830}  Home Program Compliance: {YES/NA/NO:39664}  Treatment has included: {PT interventions:33692}      Subjective       Objective       Assessment/Plan       Recommendations: {Reassess plan:79901}  Timeframe: { timeframe:}  Prognosis to achieve goals: {GOOD/FAIR/POOR:98472}      Timed:         Manual Therapy:    ***     mins  02077;     Therapeutic Exercise:    ***     mins  60878;     Neuromuscular Ann:    ***    mins  25639;    Therapeutic Activity:     ***     mins  85111;     Gait Training:      ***     mins  97027;     Ultrasound:     ***     mins  95574;    Ionto                               ***    mins   53130  Canalith Repos    ***     mins 48770      Un-Timed:  Electrical Stimulation:    ***     mins  44085 ( );  Dry Needling     ***     mins 51167/28220  Traction     ***     mins 88620  Re-Eval                           ***    mins  07003      Timed Treatment:   ***   mins   Total Treatment:     ***   mins          PT: Zorre Zeno Kimura, PT     [place] License:  [id number]  [place] License:  [id number]    Electronically signed by Zorre Zeno Kimura, PT, 25, 3:35 PM EDT    Certification Period: 2025 thru 2025  I certify that the therapy services are  furnished while this patient is under my care.  The services outlined above are required by this patient, and will be reviewed every 90 days.         Physician Signature:__________________________________________________    PHYSICIAN: Hayes Oneill MD      DATE:     Please sign and return via fax to [email or web address] . Thank you, [facility]

## 2025-05-09 ENCOUNTER — OFFICE VISIT (OUTPATIENT)
Dept: INTERNAL MEDICINE | Facility: CLINIC | Age: 80
End: 2025-05-09
Payer: MEDICARE

## 2025-05-09 VITALS
SYSTOLIC BLOOD PRESSURE: 116 MMHG | BODY MASS INDEX: 19.73 KG/M2 | HEART RATE: 74 BPM | RESPIRATION RATE: 16 BRPM | HEIGHT: 58 IN | DIASTOLIC BLOOD PRESSURE: 62 MMHG | WEIGHT: 94 LBS

## 2025-05-09 DIAGNOSIS — S22.069S CLOSED FRACTURE OF EIGHTH THORACIC VERTEBRA, UNSPECIFIED FRACTURE MORPHOLOGY, SEQUELA: ICD-10-CM

## 2025-05-09 DIAGNOSIS — M47.14 THORACIC SPONDYLOSIS WITH MYELOPATHY: ICD-10-CM

## 2025-05-09 DIAGNOSIS — G47.00 INSOMNIA, UNSPECIFIED TYPE: ICD-10-CM

## 2025-05-09 DIAGNOSIS — M54.6 ACUTE MIDLINE THORACIC BACK PAIN: ICD-10-CM

## 2025-05-09 DIAGNOSIS — G89.4 CHRONIC PAIN SYNDROME: Primary | ICD-10-CM

## 2025-05-09 RX ORDER — ZOLPIDEM TARTRATE 10 MG/1
10 TABLET ORAL NIGHTLY PRN
Qty: 15 TABLET | Refills: 1 | Status: SHIPPED | OUTPATIENT
Start: 2025-05-09

## 2025-05-09 RX ORDER — HYDROCODONE BITARTRATE AND ACETAMINOPHEN 5; 325 MG/1; MG/1
1 TABLET ORAL EVERY 4 HOURS PRN
Qty: 60 TABLET | Refills: 0 | Status: SHIPPED | OUTPATIENT
Start: 2025-05-09

## 2025-05-09 NOTE — PROGRESS NOTES
"Chief Complaint  Back Pain and Toe Pain (Right foot )    Subjective        Rafaela Cunha presents to [facility]  Back Pain  Toe Pain         Presents to clinic today for follow-up    She states that she has had significantly worsening back pain after having recent ablation procedure unfortunately.  She has had difficulties with her current pain medicine doctor and would like to be seen by someone else.  She currently is taking tramadol as well as gabapentin with minimal relief, and she was told by her pain doctor that he cannot fill Norco given tramadol use and discussion at previous visit    She also reports she is having some pain in her right toenail, this has improved however still having some pain in the nailbed    Objective   Vital Signs:  /62 (BP Location: Right arm, Patient Position: Sitting, Cuff Size: Adult)   Pulse 74   Resp 16   Ht 147.3 cm (57.98\")   Wt 42.6 kg (94 lb)   BMI 19.66 kg/m²   Estimated body mass index is 19.66 kg/m² as calculated from the following:    Height as of this encounter: 147.3 cm (57.98\").    Weight as of this encounter: 42.6 kg (94 lb).    BMI is within normal parameters. No other follow-up for BMI required.      Physical Exam  Vitals reviewed.   Constitutional:       General: She is not in acute distress.     Appearance: Normal appearance. She is not toxic-appearing.   HENT:      Head: Normocephalic and atraumatic.   Eyes:      General: No scleral icterus.     Conjunctiva/sclera: Conjunctivae normal.   Musculoskeletal:      Comments: Minimal erythema on right lateral skin nailfold of right first toe   Skin:     General: Skin is warm and dry.   Neurological:      Mental Status: She is alert and oriented to person, place, and time.   Psychiatric:         Mood and Affect: Mood normal.         Behavior: Behavior normal.        Result Review :                Assessment and Plan   Diagnoses and all orders for this visit:    1. Chronic pain " VEINSOLUTIONS CONSULTATION    HPI:    Pan Bills is a pleasant 73 year old male referred by Dr. Sheeba Alex for evaluation of increased swelling of his right lower extremity.  I treated the patient  In 2012 bilaterally for chronic venous insufficiency.  He says he is always had swelling of his right lower extremity but that he slipped and bumped the lateral aspect of the distal right leg on his deck causing significant swelling of his right lower extremity.  He has had no pleuritic chest pain, shortness of breath and has continue to wear compression on a daily basis as he always has.  Unfortunately, the swelling has been more difficult to control.  The swelling of the leg makes it difficult for him to run, causing him to have to ice his leg on a daily basis.  Icing helps control the swelling somewhat.    He had a right lower extremity ultrasound to rule out a deep vein thrombosis on 8/26/2022 which was negative for deep vein thrombosis.    The patient has no history of deep a vein thrombosis, superficial thrombophlebitis or hemorrhage.    Family history is significant for varicose veins.    PAST MEDICAL HISTORY:   Past Medical History:   Diagnosis Date     Atrial fibrillation (H)      Cancer (H)     COLON     Mitral valve regurgitation     Mod-severe MR (2-3+) per 5/14 echo       PAST SURGICAL HISTORY:   Past Surgical History:   Procedure Laterality Date     COLONOSCOPY N/A 10/1/2015    Procedure: COLONOSCOPY;  Surgeon: Markus Moore MD;  Location:  GI     GI SURGERY      partial colon resection for CA     HERNIA REPAIR      (R) inguinal  hernia repair     HERNIORRHAPHY EPIGASTRIC N/A 7/6/2016    Procedure: HERNIORRHAPHY EPIGASTRIC;  Surgeon: Shorty Salomon MD;  Location: [facility]     HERNIORRHAPHY INGUINAL Right 7/6/2016    Procedure: HERNIORRHAPHY INGUINAL;  Surgeon: Shorty Salomon MD;  Location: [facility]     VASCULAR SURGERY      VARICOSE VEIN SURGERY BILAT       FAMILY HISTORY:   Family  History   Problem Relation Age of Onset     Respiratory Mother      Cancer Father 65        Lung CA     Diabetes Maternal Grandmother      Cardiovascular Brother         Royal Vazquez       SOCIAL HISTORY:   Social History     Tobacco Use     Smoking status: Current Some Day Smoker     Packs/day: 0.50     Years: 2.00     Pack years: 1.00     Types: Cigars     Last attempt to quit: 1969     Years since quittin.7     Smokeless tobacco: Never Used     Tobacco comment: pt smokes 1 cigar per week   Substance Use Topics     Alcohol use: No     Alcohol/week: 0.0 standard drinks       REVIEW OF SYSTEMS: Review Of Systems  Skin: negative  Eyes: negative  Ears/Nose/Throat: negative  Respiratory: No shortness of breath, dyspnea on exertion, cough, or hemoptysis  Cardiovascular: irregular heart beat  Gastrointestinal: negative  Genitourinary: negative  Musculoskeletal: Leg swelling  Neurologic: negative  Psychiatric: negative  Hematologic/Lymphatic/Immunologic: negative  Endocrine: negative      Vital signs:  There were no vitals taken for this visit.    Current Outpatient Medications   Medication Sig Dispense Refill     aspirin (ASA) 81 MG chewable tablet Take 81 mg by mouth daily         PHYSICAL EXAM:  General: Pleasant, NAD.   HEENT: Normocephalic, atraumatic, external ears and nose normal.   Respiratory: Normal respiratory effort.   Cardiovascular: Pulse is regular.   Musculoskeletal: Gait and station normal.  The joints of his fingers and toes without deformity.  There is no cyanosis of his nailbeds.   EXTREMITIES: Right lower extremity: Scattered 4 to 5 mm varicosities over the distal thigh and proximal calf.  3+ edema of the leg from the mid leg to the ankle with hyperpigmentation over the distal medial right leg.  There is a scar on the distal medial right leg but he is not sure if he is ever had an ulcer.    Left lower extremity: 4 to 5 mm varicosities from the distal medial left thigh coursing down the  syndrome (Primary)  -     HYDROcodone-acetaminophen (Norco) 5-325 MG per tablet; Take 1 tablet by mouth Every 4 (Four) Hours As Needed for Severe Pain.  Dispense: 60 tablet; Refill: 0  -     Ambulatory Referral to Pain Management    2. Acute midline thoracic back pain  -     HYDROcodone-acetaminophen (Norco) 5-325 MG per tablet; Take 1 tablet by mouth Every 4 (Four) Hours As Needed for Severe Pain.  Dispense: 60 tablet; Refill: 0    3. Closed fracture of eighth thoracic vertebra, unspecified fracture morphology, sequela  -     Ambulatory Referral to Pain Management    4. Thoracic spondylosis with myelopathy  -     Ambulatory Referral to Pain Management    5. Insomnia, unspecified type  -     zolpidem (AMBIEN) 10 MG tablet; Take 1 tablet by mouth At Night As Needed for Sleep.  Dispense: 15 tablet; Refill: 1      Continues to have severe debilitating back pain despite ablation by pain medicine that unfortunately worsened her pain, no relief with tramadol and increase in gabapentin. I am okay for her to take norco as needed given evidence of multiple compression fractures and until she sees new pain medicine doctor who she would like to see [facility]     Insomnia is chronic, reports ambien is helpful given her associated pain and has taken prn in past.     RTC as previously scheduled or sooner prn          Follow Up   No follow-ups on file.  Patient was given instructions and counseling regarding her condition or for health maintenance advice. Please see specific information pulled into the AVS if appropriate.              medial left calf.  1+ edema of the left ankle with some mild stasis hyperpigmentation..    PULSES: R/L (3=normal pulse, 0=no palpable pulse) dorsalis pedis: 2/0; posterior tibial: 3/3.      Neurologic: Grossly normal  Psychiatric: Mood, affect, judgment and insight are normal     Venous Ultrasound:   VENOUS ULTRASOUND RIGHT LOWER EXTREMITY  8/26/2022 11:45 AM      HISTORY: Rule out DVT. Right calf pain.     COMPARISON: None.     TECHNIQUE: Color Doppler and spectral waveform analysis performed  throughout the deep veins of the right lower extremity.     FINDINGS: The right common femoral, proximal greater saphenous,  femoral, and popliteal veins demonstrate normal blood flow,  compression, and augmentation. Posterior tibial and peroneal veins are  compressible. Varicosities noted in the proximal to distal calf, found  to be patent.                                                                      IMPRESSION:  1. Negative for DVT in the right lower extremity.  2. Patent varicosities in the calf.     SIMIN RAMÍREZ MD     ASSESSMENT:  Posttraumatic swelling of the right lower extremity, not associated with deep vein thrombosis.  He states his right leg is always been somewhat more swollen than the left and has remained so despite wearing compression hose.  Currently, however, the swelling is such that it makes it difficult for him to run which is part of his regular exercise routine.    He needs a right lower extremity venous competency study to assess for underlying valve incompetence.  If there is significant axial incompetence that can be treated, we will address it.  He may need lymphedema treatment with manual lymph drainage and wrapping techniques.  This was discussed with him.    PLAN:  Right lower extremity venous competency study with video visit to discuss results.     Cosme Mccray MD    Dictated using Dragon voice recognition software which may result in transcription errors          VEIN  CLINIC LEG DRAWING:

## 2025-05-29 ENCOUNTER — TELEPHONE (OUTPATIENT)
Dept: INTERNAL MEDICINE | Facility: CLINIC | Age: 80
End: 2025-05-29
Payer: MEDICARE

## 2025-06-02 DIAGNOSIS — M80.08XA AGE-RELATED OSTEOPOROSIS WITH CURRENT PATHOLOGICAL FRACTURE, VERTEBRA(E), INITIAL ENCOUNTER FOR FRACTURE: ICD-10-CM

## 2025-06-02 DIAGNOSIS — G89.4 CHRONIC PAIN SYNDROME: ICD-10-CM

## 2025-06-02 NOTE — TELEPHONE ENCOUNTER
Caller: Rafaela Cunha    Relationship: Self    Best call back number: [phone]     Requested Prescriptions:   Requested Prescriptions     Pending Prescriptions Disp Refills    traMADol (ULTRAM) 50 MG tablet 180 tablet 0     Sig: Take 2 tablets by mouth Every 12 (Twelve) Hours As Needed for Severe Pain.        Pharmacy where request should be sent: [id number] [address] AT [place] [phone] [place], [place] [phone]      Last office visit with prescribing clinician: 5/9/2025   Last telemedicine visit with prescribing clinician: Visit date not found   Next office visit with prescribing clinician: 6/27/2025         Otoniel Shoemaker Rep   06/02/25 10:52 EDT

## 2025-06-02 NOTE — TELEPHONE ENCOUNTER
Can you call her and inform her that this has been filled mostly by her pain doctor the past few months and that I would prefer she have this filled by her new pain doctor?    I can provide a short course however I would want her to have this filled by her pain doctor as I am also prescribing the Norco

## 2025-06-03 RX ORDER — TRAMADOL HYDROCHLORIDE 50 MG/1
100 TABLET ORAL EVERY 12 HOURS PRN
Qty: 30 TABLET | Refills: 0 | Status: SHIPPED | OUTPATIENT
Start: 2025-06-03

## 2025-06-03 NOTE — TELEPHONE ENCOUNTER
PT said she is scheduled with pain management on the 16 of June, she stated she will be out at the end of the week, and Pain DRElsi Would not fill it till they saw her. So she asked if you could fill it till then.

## 2025-06-03 NOTE — TELEPHONE ENCOUNTER
Hub staff attempted to follow warm transfer process and was unsuccessful     Caller: Rafaela Cunha    Relationship to patient: Self    Best call back number: [phone]     Patient is needing: PATIENT IS REQUESTING TO SPEAK TO SCOTTIE. PLEASE CALL

## 2025-06-20 ENCOUNTER — LAB (OUTPATIENT)
Facility: HOSPITAL | Age: 80
End: 2025-06-20
Payer: MEDICARE

## 2025-06-20 PROCEDURE — 84443 ASSAY THYROID STIM HORMONE: CPT | Performed by: STUDENT IN AN ORGANIZED HEALTH CARE EDUCATION/TRAINING PROGRAM

## 2025-06-20 PROCEDURE — 82306 VITAMIN D 25 HYDROXY: CPT | Performed by: STUDENT IN AN ORGANIZED HEALTH CARE EDUCATION/TRAINING PROGRAM

## 2025-06-20 PROCEDURE — 83970 ASSAY OF PARATHORMONE: CPT | Performed by: STUDENT IN AN ORGANIZED HEALTH CARE EDUCATION/TRAINING PROGRAM

## 2025-06-20 PROCEDURE — 84100 ASSAY OF PHOSPHORUS: CPT | Performed by: STUDENT IN AN ORGANIZED HEALTH CARE EDUCATION/TRAINING PROGRAM

## 2025-06-29 NOTE — PROGRESS NOTES
>>ASSESSMENT AND PLAN FOR ALCOHOL USE DISORDER WRITTEN ON 6/28/2025  8:26 AM BY ANITHA BRITTON MD    Patient's wife mentioned that patient drinks up to a bottle of wine a day. Patient endorses use of chewing tobacco.  Patient's lactic acidosis could be in the setting of thiamine deficiency vs alcohol abuse.  B12, B9 WNL    Plan:  Start IV thiamine 100 mg TID  CIWA protocol  Pending results for  Thiamine  NRT ordered   The ABCs of the Annual Wellness Visit  Subsequent Medicare Wellness Visit    Subjective    Rafaela Cunha is a 79 y.o. female who presents for a Subsequent Medicare Wellness Visit.    The following portions of the patient's history were reviewed and   updated as appropriate: allergies, current medications, past family history, past medical history, past social history, past surgical history, and problem list.    Compared to one year ago, the patient feels her physical   health is worse.    Compared to one year ago, the patient feels her mental   health is the same.    Recent Hospitalizations:  She was not admitted to the hospital during the last year.       Current Medical Providers:  Patient Care Team:  Lexa Moreno MD as PCP - General (Internal Medicine)    Outpatient Medications Prior to Visit   Medication Sig Dispense Refill    acyclovir (ZOVIRAX) 5 % ointment APPLY TO THE AFFECTED AREA(S) BY TOPICAL ROUTE EVERY 3 HOURS 6 TIMES PER DAY      alendronate (FOSAMAX) 70 MG tablet Take 1 tablet by mouth Every 7 (Seven) Days. 15 tablet 0    Calcium Citrate 150 MG capsule Take 250 mg by mouth.      Cholecalciferol 25 MCG (1000 UT) tablet Take 2 tablets by mouth Daily.      desoximetasone (TOPICORT) 0.25 % cream APPLY A THIN LAYER TO THE AFFECTED AREA(S) BY TOPICAL ROUTE 2 TIMES PER DAY ; RUB IN GENTLY AND COMPLETELY      MAGNESIUM GLYCINATE PO Take  by mouth.      SUMAtriptan (IMITREX) 100 MG tablet Take one tablet at onset of headache. May repeat dose one time in 2 hours if headache not relieved. 90 tablet 0    traMADol (ULTRAM) 50 MG tablet Take 1 tablet by mouth Every 6 (Six) Hours As Needed for Severe Pain. 30 tablet 0    mupirocin (BACTROBAN) 2 % ointment  (Patient not taking: Reported on 6/14/2024)      NON FORMULARY Take 335 mg by mouth.      venlafaxine XR (Effexor XR) 37.5 MG 24 hr capsule Take 1 capsule by mouth Daily for 14 days, THEN 2 capsules Daily for 30 days. 90 capsule 0    zolpidem (AMBIEN) 10 MG tablet  "Take 0.5-1 tablets by mouth every night at bedtime.       No facility-administered medications prior to visit.       Opioid medication/s are on active medication list.  and I have evaluated her active treatment plan and pain score trends (see table).  There were no vitals filed for this visit.  I have reviewed the chart for potential of high risk medication and harmful drug interactions in the elderly.          Aspirin is not on active medication list.  Aspirin use is not indicated based on review of current medical condition/s. Risk of harm outweighs potential benefits.  .    There is no problem list on file for this patient.    Advance Care Planning   Advance Care Planning     Advance Directive is not on file.  ACP discussion was held with the patient during this visit. Patient does not have an advance directive, information provided.     Objective    Vitals:    24 1406   BP: 122/60   Pulse: 91   Temp: 98.5 °F (36.9 °C)   Weight: 42.6 kg (94 lb)   Height: 147.3 cm (58\")     Estimated body mass index is 19.65 kg/m² as calculated from the following:    Height as of this encounter: 147.3 cm (58\").    Weight as of this encounter: 42.6 kg (94 lb).    BMI is within normal parameters. No other follow-up for BMI required.      Does the patient have evidence of cognitive impairment? No          HEALTH RISK ASSESSMENT    Smoking Status:  Social History     Tobacco Use   Smoking Status Never    Passive exposure: Never   Smokeless Tobacco Never     Alcohol Consumption:  Social History     Substance and Sexual Activity   Alcohol Use Yes    Comment: RARE     Fall Risk Screen:    STEADI Fall Risk Assessment was completed, and patient is at LOW risk for falls.Assessment completed on:2024    Depression Screenin/14/2024     2:13 PM   PHQ-2/PHQ-9 Depression Screening   Little Interest or Pleasure in Doing Things 0-->not at all   Feeling Down, Depressed or Hopeless 0-->not at all   PHQ-9: Brief Depression Severity " Measure Score 0       Health Habits and Functional and Cognitive Screenin/14/2024     2:11 PM   Functional & Cognitive Status   Do you have difficulty preparing food and eating? No   Do you have difficulty bathing yourself, getting dressed or grooming yourself? No   Do you have difficulty using the toilet? No   Do you have difficulty moving around from place to place? No   Do you have trouble with steps or getting out of a bed or a chair? No   Current Diet Well Balanced Diet   Dental Exam Up to date   Eye Exam Up to date   Exercise (times per week) 6 times per week   Current Exercises Include Walking   Do you need help using the phone?  No   Are you deaf or do you have serious difficulty hearing?  No   Do you need help to go to places out of walking distance? No   Do you need help shopping? No   Do you need help preparing meals?  No   Do you need help with housework?  No   Do you need help with laundry? No   Do you need help taking your medications? No   Do you need help managing money? No   Do you ever drive or ride in a car without wearing a seat belt? No   Have you felt unusual stress, anger or loneliness in the last month? Yes   Who do you live with? Spouse   If you need help, do you have trouble finding someone available to you? No   Have you been bothered in the last four weeks by sexual problems? No   Do you have difficulty concentrating, remembering or making decisions? No       Age-appropriate Screening Schedule:  Refer to the list below for future screening recommendations based on patient's age, sex and/or medical conditions. Orders for these recommended tests are listed in the plan section. The patient has been provided with a written plan.    Health Maintenance   Topic Date Due    RSV Vaccine - Adults (1 - 1-dose 60+ series) Never done    COVID-19 Vaccine (2023- season) 03/15/2024    ANNUAL WELLNESS VISIT  Never done    INFLUENZA VACCINE  2024    DXA SCAN  2026    TDAP/TD  "VACCINES (2 - Td or Tdap) 09/05/2028    HEPATITIS C SCREENING  Completed    Pneumococcal Vaccine 65+  Completed    ZOSTER VACCINE  Completed                  CMS Preventative Services Quick Reference  Risk Factors Identified During Encounter  Chronic Pain: Orthopedics Referral Ordered  On tramadol, increased as mentioned below due to chronic pain   Fall Risk-High or Moderate: Discussed Fall Prevention in the home  Immunizations Discussed/Encouraged: COVID19 and RSV (Respiratory Syncytial Virus)  Dental Screening Recommended  Vision Screening Recommended  The above risks/problems have been discussed with the patient.  Pertinent information has been shared with the patient in the After Visit Summary.  An After Visit Summary and PPPS were made available to the patient.    Follow Up:   Next Medicare Wellness visit to be scheduled in 1 year.       Additional E&M Note during same encounter follows:  Patient has multiple medical problems which are significant and separately identifiable that require additional work above and beyond the Medicare Wellness Visit.      Chief Complaint  Medicare Wellness-subsequent    Subjective        HPI  Rafaela Cunha is also being seen today for worsening chronic pain. Reports having severe pain in her back and hips as well as some associated night sweats     Also has persistent migraines, did not tolerate effexor       Review of Systems   Constitutional:  Positive for fatigue. Negative for fever.   Musculoskeletal:  Positive for arthralgias, back pain and gait problem.       Objective   Vital Signs:  /60   Pulse 91   Temp 98.5 °F (36.9 °C)   Ht 147.3 cm (58\")   Wt 42.6 kg (94 lb)   BMI 19.65 kg/m²     Physical Exam  Vitals reviewed.   Constitutional:       General: She is not in acute distress.     Appearance: Normal appearance. She is not toxic-appearing.   HENT:      Head: Normocephalic and atraumatic.   Eyes:      General: No scleral icterus.     Conjunctiva/sclera: Conjunctivae " normal.   Skin:     General: Skin is warm and dry.   Neurological:      Mental Status: She is alert and oriented to person, place, and time.   Psychiatric:         Mood and Affect: Mood normal.         Behavior: Behavior normal.                         Assessment and Plan   Diagnoses and all orders for this visit:    1. Medicare annual wellness visit, subsequent (Primary)    2. Insomnia, unspecified type  -     zolpidem (AMBIEN) 10 MG tablet; Take 0.5 tablets by mouth At Night As Needed for Sleep.  Dispense: 90 tablet; Refill: 0    3. Chronic pain syndrome  -     traMADol (ULTRAM) 50 MG tablet; Take 2 tablets by mouth Every 12 (Twelve) Hours As Needed for Severe Pain.  Dispense: 180 tablet; Refill: 0  -     Ambulatory Referral to Orthopedic Surgery    4. Age-related osteoporosis with current pathological fracture, vertebra(e), initial encounter for fracture  -     traMADol (ULTRAM) 50 MG tablet; Take 2 tablets by mouth Every 12 (Twelve) Hours As Needed for Severe Pain.  Dispense: 180 tablet; Refill: 0  -     Ambulatory Referral to Orthopedic Surgery    5. Night sweats  -     CBC & Differential  -     TSH+Free T4    6. Encounter for hepatitis C screening test for low risk patient  -     Hepatitis C Antibody    7. Chronic migraine without aura without status migrainosus, not intractable  -     amitriptyline (ELAVIL) 10 MG tablet; Take 1 tablet by mouth Every Night.  Dispense: 60 tablet; Refill: 0      Continues to have persistent pain with polyarthritis, unfortunately suspect related to her osteoporosis which needs managed. Will refer to specialist as she has hx of intolerance to multiple treatments, will increase tramadol reviewed JUAN    Will also trial amitriptyline for migraines, did not tolerate effexor     Night sweats or somewhat new, will r/o TSH and hematological abnormality    RTC in 3mo or sooner prn            Follow Up   Return in about 3 months (around 9/14/2024).  Patient was given instructions and  counseling regarding her condition or for health maintenance advice. Please see specific information pulled into the AVS if appropriate.

## 2025-07-28 ENCOUNTER — TRANSCRIBE ORDERS (OUTPATIENT)
Dept: ADMINISTRATIVE | Facility: HOSPITAL | Age: 80
End: 2025-07-28
Payer: MEDICARE

## 2025-07-28 DIAGNOSIS — S22.070D COMPRESSION FRACTURE OF T9 VERTEBRA WITH ROUTINE HEALING: Primary | ICD-10-CM

## 2025-07-28 DIAGNOSIS — Z98.890 S/P KYPHOPLASTY: ICD-10-CM

## 2025-07-28 DIAGNOSIS — M54.6 PAIN IN THORACIC SPINE: ICD-10-CM

## 2025-07-29 ENCOUNTER — HOSPITAL ENCOUNTER (OUTPATIENT)
Facility: HOSPITAL | Age: 80
Discharge: HOME | End: 2025-07-29
Admitting: STUDENT IN AN ORGANIZED HEALTH CARE EDUCATION/TRAINING PROGRAM
Payer: MEDICARE

## 2025-07-29 ENCOUNTER — OFFICE VISIT (OUTPATIENT)
Dept: INTERNAL MEDICINE | Facility: CLINIC | Age: 80
End: 2025-07-29
Payer: MEDICARE

## 2025-07-29 ENCOUNTER — TELEPHONE (OUTPATIENT)
Dept: INTERNAL MEDICINE | Facility: CLINIC | Age: 80
End: 2025-07-29

## 2025-07-29 VITALS
OXYGEN SATURATION: 96 % | BODY MASS INDEX: 20.78 KG/M2 | WEIGHT: 99 LBS | HEART RATE: 84 BPM | DIASTOLIC BLOOD PRESSURE: 62 MMHG | HEIGHT: 58 IN | SYSTOLIC BLOOD PRESSURE: 118 MMHG

## 2025-07-29 DIAGNOSIS — G89.4 CHRONIC PAIN SYNDROME: ICD-10-CM

## 2025-07-29 DIAGNOSIS — Z00.00 MEDICARE ANNUAL WELLNESS VISIT, SUBSEQUENT: Primary | ICD-10-CM

## 2025-07-29 DIAGNOSIS — G89.29 CHRONIC PAIN OF RIGHT KNEE: ICD-10-CM

## 2025-07-29 DIAGNOSIS — M17.11 PRIMARY OSTEOARTHRITIS OF RIGHT KNEE: ICD-10-CM

## 2025-07-29 DIAGNOSIS — M80.08XA AGE-RELATED OSTEOPOROSIS WITH CURRENT PATHOLOGICAL FRACTURE, VERTEBRA(E), INITIAL ENCOUNTER FOR FRACTURE: ICD-10-CM

## 2025-07-29 DIAGNOSIS — Z79.899 HIGH RISK MEDICATION USE: ICD-10-CM

## 2025-07-29 DIAGNOSIS — G25.81 RLS (RESTLESS LEGS SYNDROME): ICD-10-CM

## 2025-07-29 DIAGNOSIS — M25.561 CHRONIC PAIN OF RIGHT KNEE: ICD-10-CM

## 2025-07-29 DIAGNOSIS — G47.00 INSOMNIA, UNSPECIFIED TYPE: ICD-10-CM

## 2025-07-29 PROBLEM — M47.816 LUMBAR SPONDYLOSIS: Status: ACTIVE | Noted: 2024-11-13

## 2025-07-29 PROBLEM — M47.814 THORACIC SPONDYLOSIS: Status: ACTIVE | Noted: 2024-11-13

## 2025-07-29 PROCEDURE — 73562 X-RAY EXAM OF KNEE 3: CPT

## 2025-07-29 PROCEDURE — 1159F MED LIST DOCD IN RCRD: CPT | Performed by: STUDENT IN AN ORGANIZED HEALTH CARE EDUCATION/TRAINING PROGRAM

## 2025-07-29 PROCEDURE — G0439 PPPS, SUBSEQ VISIT: HCPCS | Performed by: STUDENT IN AN ORGANIZED HEALTH CARE EDUCATION/TRAINING PROGRAM

## 2025-07-29 PROCEDURE — 1160F RVW MEDS BY RX/DR IN RCRD: CPT | Performed by: STUDENT IN AN ORGANIZED HEALTH CARE EDUCATION/TRAINING PROGRAM

## 2025-07-29 PROCEDURE — 1125F AMNT PAIN NOTED PAIN PRSNT: CPT | Performed by: STUDENT IN AN ORGANIZED HEALTH CARE EDUCATION/TRAINING PROGRAM

## 2025-07-29 PROCEDURE — 99214 OFFICE O/P EST MOD 30 MIN: CPT | Performed by: STUDENT IN AN ORGANIZED HEALTH CARE EDUCATION/TRAINING PROGRAM

## 2025-07-29 NOTE — PROGRESS NOTES
Subjective   The ABCs of the Annual Wellness Visit  Medicare Wellness Visit      Rafaela Cunha is a 80 y.o. patient who presents for a Medicare Wellness Visit.    The following portions of the patient's history were reviewed and   updated as appropriate: allergies, current medications, past family history, past medical history, past social history, past surgical history, and problem list.    Compared to one year ago, the patient's physical   health is worse.  Compared to one year ago, the patient's mental   health is worse.    Recent Hospitalizations:  She was not admitted to the hospital during the last year.     Current Medical Providers:  Patient Care Team:  Lexa Moreno MD as PCP - General (Internal Medicine)    Outpatient Medications Prior to Visit   Medication Sig Dispense Refill    acyclovir (ZOVIRAX) 5 % ointment APPLY TO THE AFFECTED AREA(S) BY TOPICAL ROUTE EVERY 3 HOURS 6 TIMES PER DAY      Calcium Citrate 150 MG capsule Take 250 mg by mouth.      Cholecalciferol 25 MCG (1000 UT) tablet Take 2 tablets by mouth Daily.      desoximetasone (TOPICORT) 0.25 % cream APPLY A THIN LAYER TO THE AFFECTED AREA(S) BY TOPICAL ROUTE 2 TIMES PER DAY ; RUB IN GENTLY AND COMPLETELY      HYDROcodone-acetaminophen (Norco) 5-325 MG per tablet Take 1 tablet by mouth Every 4 (Four) Hours As Needed for Severe Pain. 60 tablet 0    MAGNESIUM GLYCINATE PO Take  by mouth.      mupirocin (BACTROBAN) 2 % ointment       naloxone (NARCAN) 4 MG/0.1ML nasal spray USE 1 SPRAY IN ONE NOSTRIL AS NEEDED FOR OVERDOSE. MAY REPEAT DOSE EVERY 2 TO 3 MINUTES UNTIL RESPONSIVE OR EMS ARRIVES      SUMAtriptan (IMITREX) 100 MG tablet TAKE 1 TABLET AT ONSET OF HEADACHE--MAY REPEAT DOSE ONCE IN 2 HOURS IF HEADACHE NOT RELIEVED 90 tablet 0    traMADol (ULTRAM) 50 MG tablet Take 2 tablets by mouth Every 12 (Twelve) Hours As Needed for Severe Pain. 30 tablet 0    valACYclovir (Valtrex) 500 MG tablet Take 1 tablet by mouth 2 (Two) Times a Day. 30 tablet 0  "   zolpidem (AMBIEN) 10 MG tablet Take 1 tablet by mouth At Night As Needed for Sleep. 15 tablet 1    gabapentin (NEURONTIN) 100 MG capsule Take 1 capsule by mouth 3 (Three) Times a Day. (Patient not taking: Reported on 7/29/2025) 30 capsule 0    loteprednol (LOTEMAX) 0.5 % ophthalmic suspension       lidocaine (LIDODERM) 5 % Place 1 patch on the skin as directed by provider Daily. 30 each 2     No facility-administered medications prior to visit.     Opioid medication/s are on active medication list.  and I have evaluated her active treatment plan and pain score trends (see table).  There were no vitals filed for this visit.  I have reviewed the chart for potential of high risk medication and harmful drug interactions in the elderly.        Aspirin is not on active medication list.  Aspirin use is not indicated based on review of current medical condition/s. Risk of harm outweighs potential benefits.  .    Patient Active Problem List   Diagnosis    Closed fracture of thoracic vertebra    Compression fracture of pelvis with routine healing    Insomnia    Low back pain    Osteoporosis    Chronic pain disorder    Lumbar spondylosis    Thoracic spondylosis     Advance Care Planning Advance Directive is not on file.  ACP discussion was held with the patient during this visit. Patient does not have an advance directive, information provided.            Objective   Vitals:    07/29/25 1459   BP: 118/62   Pulse: 84   SpO2: 96%   Weight: 44.9 kg (99 lb)   Height: 147.3 cm (57.99\")       Estimated body mass index is 20.7 kg/m² as calculated from the following:    Height as of this encounter: 147.3 cm (57.99\").    Weight as of this encounter: 44.9 kg (99 lb).    BMI is within normal parameters. No other follow-up for BMI required.           Does the patient have evidence of cognitive impairment? No                                                                                                Health  Risk Assessment    Smoking " Status:  Social History     Tobacco Use   Smoking Status Never    Passive exposure: Never   Smokeless Tobacco Never     Alcohol Consumption:  Social History     Substance and Sexual Activity   Alcohol Use Not Currently    Comment: RARE       Fall Risk Screen  STEADI Fall Risk Assessment was completed, and patient is at LOW risk for falls.Assessment completed on:2025    Depression Screening   Little interest or pleasure in doing things? Not at all   Feeling down, depressed, or hopeless? Not at all   PHQ-2 Total Score 0      Health Habits and Functional and Cognitive Screenin/27/2025     3:40 PM   Functional & Cognitive Status   Do you have difficulty preparing food and eating? No   Do you have difficulty bathing yourself, getting dressed or grooming yourself? No   Do you have difficulty using the toilet? No   Do you have difficulty moving around from place to place? No   Do you have trouble with steps or getting out of a bed or a chair? No   Current Diet Well Balanced Diet   Dental Exam Up to date   Eye Exam Up to date   Exercise (times per week) 4 times per week   Current Exercises Include Badminton;House Cleaning;Light Weights;Stationary Bicycling/Spin Class;Treadmill;Walking   Do you need help using the phone?  No   Are you deaf or do you have serious difficulty hearing?  No   Do you need help to go to places out of walking distance? No   Do you need help shopping? No   Do you need help preparing meals?  No   Do you need help with housework?  No   Do you need help with laundry? No   Do you need help taking your medications? No   Do you need help managing money? No   Do you ever drive or ride in a car without wearing a seat belt? No   Have you felt unusual fatigue (could be tiredness), stress, anger or loneliness in the last month? No   Who do you live with? Spouse   If you need help, do you have trouble finding someone available to you? No   Have you been bothered in the last four weeks by sexual  problems? No   Do you have difficulty concentrating, remembering or making decisions? No           Age-appropriate Screening Schedule:  Refer to the list below for future screening recommendations based on patient's age, sex and/or medical conditions. Orders for these recommended tests are listed in the plan section. The patient has been provided with a written plan.    Health Maintenance List  Health Maintenance   Topic Date Due    RSV Vaccine - Adults (1 - 1-dose 75+ series) Never done    ANNUAL WELLNESS VISIT  06/14/2025    INFLUENZA VACCINE  10/01/2025    DXA SCAN  05/06/2026    TDAP/TD VACCINES (2 - Td or Tdap) 09/05/2028    HEPATITIS C SCREENING  Completed    COVID-19 Vaccine  Completed    Pneumococcal Vaccine 50+  Completed    ZOSTER VACCINE  Completed    COLORECTAL CANCER SCREENING  Discontinued                                                                                                                                                CMS Preventative Services Quick Reference  Risk Factors Identified During Encounter  Chronic Pain: follows with pain medicine, see below for further details  Fall Risk-High or Moderate: Discussed Fall Prevention in the home  Immunizations Discussed/Encouraged: RSV (Respiratory Syncytial Virus)  Dental Screening Recommended  Vision Screening Recommended    The above risks/problems have been discussed with the patient.  Pertinent information has been shared with the patient in the After Visit Summary.  An After Visit Summary and PPPS were made available to the patient.    Follow Up:   Next Medicare Wellness visit to be scheduled in 1 year.         Additional E&M Note during same encounter follows:  Patient has additional, significant, and separately identifiable condition(s)/problem(s) that require work above and beyond the Medicare Wellness Visit     Chief Complaint  Medicare Wellness-subsequent    Subjective   HPI  Rafaela is also being seen today for additional medical  "problem/s.    Follow up of chronic medical conditions    She continues to have significant back pain and is now having knee pain. She is following pain medicine as well as [facility]. She is working to find correct brace for her back. She has been trialed on oxycodone for her chronic pain but this made her sleepy so she stopped taking. She states only thing that seems to help is tramadol and hydrocodone for breakthrough pain. Gabapentin also made her drowsy.     She also feels like she is having restless legs.    Additionally she states that she has had progressively worsening knee pain. Denies any trauma to it but is worse being on it long periods of time               Objective   Vital Signs:  /62   Pulse 84   Ht 147.3 cm (57.99\")   Wt 44.9 kg (99 lb)   SpO2 96%   BMI 20.70 kg/m²   Physical Exam  Vitals reviewed.   Constitutional:       General: She is not in acute distress.     Appearance: Normal appearance. She is underweight. She is not toxic-appearing.   HENT:      Head: Normocephalic and atraumatic.   Eyes:      General: No scleral icterus.     Conjunctiva/sclera: Conjunctivae normal.   Cardiovascular:      Rate and Rhythm: Normal rate.      Heart sounds: Normal heart sounds.   Pulmonary:      Effort: Pulmonary effort is normal. No respiratory distress.   Musculoskeletal:      Right knee: Bony tenderness and crepitus present. No swelling, effusion or erythema. Normal range of motion.      Left knee: No swelling or effusion. Normal range of motion.   Skin:     General: Skin is warm and dry.   Neurological:      Mental Status: She is alert and oriented to person, place, and time.   Psychiatric:         Mood and Affect: Mood normal.         Behavior: Behavior normal.              Assessment and Plan         Medicare annual wellness visit, subsequent    Chronic pain of right knee    Primary osteoarthritis of right knee    Insomnia, unspecified type    RLS (restless legs " syndrome)    Age-related osteoporosis with current pathological fracture, vertebra(e), initial encounter for fracture    Chronic pain syndrome    High risk medication use    Diagnoses and all orders for this visit:    1. Medicare annual wellness visit, subsequent (Primary)    2. Chronic pain of right knee  -     XR Knee 3 View Right  -     Ferritin; Future  -     Iron Profile w/o Ferritin; Future  -     Ambulatory Referral to Orthopedic Surgery    3. Primary osteoarthritis of right knee  -     Ambulatory Referral to Orthopedic Surgery    4. Insomnia, unspecified type  -     Ferritin; Future  -     Iron Profile w/o Ferritin; Future  -     CBC & Differential; Future    5. RLS (restless legs syndrome)  -     Ferritin; Future  -     Iron Profile w/o Ferritin; Future  -     CBC & Differential; Future    6. Age-related osteoporosis with current pathological fracture, vertebra(e), initial encounter for fracture  -     Compliance Drug Analysis, Ur - Urine, Clean Catch; Future  -     Comprehensive Metabolic Panel; Future    7. Chronic pain syndrome  -     Compliance Drug Analysis, Ur - Urine, Clean Catch; Future    8. High risk medication use  -     Compliance Drug Analysis, Ur - Urine, Clean Catch; Future       Continues to have chronic pain secondary to her severe osteoporosis. Trying to optimize and find best way to manage this with pain medicine and [facility]. She is working to get best back brace, has upcoming appt with endocrinology at [facility]. Attempted oxycodone for pain but did not tolerate to due side effects. Advised to discuss with pain medicine at upcoming appt about taking over tramadol and hydrocodone prn for breakthrough pain, if they do not feel comfortable I am happy to take over and we will obtain UDS in case I do take over, JUAN cruz    Will also evaluate for iron deficiency given reported RLS, unfortunately do not know if any further pharmacotherapy is best option. 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 Will get xray and refer to see if she would benefit from injection    RTC in 6mo or sooner prn labs prior                 Follow Up   Return in about 6 months (around 1/29/2026) for Lab before FU.  Patient was given instructions and counseling regarding her condition or for health maintenance advice. Please see specific information pulled into the AVS if appropriate.

## 2025-07-30 NOTE — TELEPHONE ENCOUNTER
Patient wants to speak to [facility] KENNEDY GTZ regarding the neurolgy message below.   [phone] - patient These are already in chart.

## 2025-08-18 ENCOUNTER — OFFICE VISIT (OUTPATIENT)
Dept: ORTHOPEDIC SURGERY | Facility: CLINIC | Age: 80
End: 2025-08-18
Payer: MEDICARE

## 2025-08-18 VITALS — TEMPERATURE: 97.5 F | BODY MASS INDEX: 20.95 KG/M2 | WEIGHT: 99.8 LBS | HEIGHT: 58 IN

## 2025-08-18 DIAGNOSIS — R52 PAIN: Primary | ICD-10-CM

## 2025-08-18 PROCEDURE — 99203 OFFICE O/P NEW LOW 30 MIN: CPT | Performed by: ORTHOPAEDIC SURGERY

## 2025-08-25 DIAGNOSIS — M80.08XA AGE-RELATED OSTEOPOROSIS WITH CURRENT PATHOLOGICAL FRACTURE, VERTEBRA(E), INITIAL ENCOUNTER FOR FRACTURE: ICD-10-CM

## 2025-08-25 DIAGNOSIS — M54.6 ACUTE MIDLINE THORACIC BACK PAIN: ICD-10-CM

## 2025-08-25 DIAGNOSIS — G89.4 CHRONIC PAIN SYNDROME: ICD-10-CM

## 2025-08-25 RX ORDER — HYDROCODONE BITARTRATE AND ACETAMINOPHEN 5; 325 MG/1; MG/1
1 TABLET ORAL EVERY 4 HOURS PRN
Qty: 60 TABLET | Refills: 0 | Status: SHIPPED | OUTPATIENT
Start: 2025-09-05 | End: 2025-08-26

## 2025-08-25 RX ORDER — TRAMADOL HYDROCHLORIDE 50 MG/1
100 TABLET ORAL EVERY 12 HOURS PRN
Qty: 30 TABLET | Refills: 0 | Status: SHIPPED | OUTPATIENT
Start: 2025-09-05 | End: 2025-08-26

## 2025-08-26 RX ORDER — HYDROCODONE BITARTRATE AND ACETAMINOPHEN 5; 325 MG/1; MG/1
1 TABLET ORAL EVERY 4 HOURS PRN
Qty: 60 TABLET | Refills: 0 | Status: SHIPPED | OUTPATIENT
Start: 2025-09-13

## 2025-08-26 RX ORDER — TRAMADOL HYDROCHLORIDE 50 MG/1
100 TABLET ORAL EVERY 12 HOURS PRN
Qty: 30 TABLET | Refills: 0 | Status: SHIPPED | OUTPATIENT
Start: 2025-09-13